# Patient Record
Sex: FEMALE | Race: WHITE | NOT HISPANIC OR LATINO | URBAN - METROPOLITAN AREA
[De-identification: names, ages, dates, MRNs, and addresses within clinical notes are randomized per-mention and may not be internally consistent; named-entity substitution may affect disease eponyms.]

---

## 2017-01-06 ENCOUNTER — OUTPATIENT (OUTPATIENT)
Dept: OUTPATIENT SERVICES | Facility: HOSPITAL | Age: 72
LOS: 1 days | End: 2017-01-06
Payer: MEDICARE

## 2017-01-06 VITALS
RESPIRATION RATE: 18 BRPM | HEART RATE: 70 BPM | DIASTOLIC BLOOD PRESSURE: 53 MMHG | OXYGEN SATURATION: 96 % | HEIGHT: 64 IN | SYSTOLIC BLOOD PRESSURE: 155 MMHG | WEIGHT: 250 LBS | TEMPERATURE: 98 F

## 2017-01-06 DIAGNOSIS — Z98.890 OTHER SPECIFIED POSTPROCEDURAL STATES: Chronic | ICD-10-CM

## 2017-01-06 DIAGNOSIS — Z01.818 ENCOUNTER FOR OTHER PREPROCEDURAL EXAMINATION: ICD-10-CM

## 2017-01-06 DIAGNOSIS — Z90.49 ACQUIRED ABSENCE OF OTHER SPECIFIED PARTS OF DIGESTIVE TRACT: Chronic | ICD-10-CM

## 2017-01-06 DIAGNOSIS — I20.0 UNSTABLE ANGINA: ICD-10-CM

## 2017-01-06 LAB
ALBUMIN SERPL ELPH-MCNC: 4.2 G/DL — SIGNIFICANT CHANGE UP (ref 3.3–5)
ALP SERPL-CCNC: 65 U/L — SIGNIFICANT CHANGE UP (ref 40–120)
ALT FLD-CCNC: 25 U/L RC — SIGNIFICANT CHANGE UP (ref 10–45)
ANION GAP SERPL CALC-SCNC: 17 MMOL/L — SIGNIFICANT CHANGE UP (ref 5–17)
AST SERPL-CCNC: 27 U/L — SIGNIFICANT CHANGE UP (ref 10–40)
BILIRUB SERPL-MCNC: 0.4 MG/DL — SIGNIFICANT CHANGE UP (ref 0.2–1.2)
BUN SERPL-MCNC: 39 MG/DL — HIGH (ref 7–23)
CALCIUM SERPL-MCNC: 9.5 MG/DL — SIGNIFICANT CHANGE UP (ref 8.4–10.5)
CHLORIDE SERPL-SCNC: 105 MMOL/L — SIGNIFICANT CHANGE UP (ref 96–108)
CO2 SERPL-SCNC: 21 MMOL/L — LOW (ref 22–31)
CREAT SERPL-MCNC: 1.44 MG/DL — HIGH (ref 0.5–1.3)
GLUCOSE SERPL-MCNC: 146 MG/DL — HIGH (ref 70–99)
HCT VFR BLD CALC: 30.5 % — LOW (ref 34.5–45)
HGB BLD-MCNC: 10.2 G/DL — LOW (ref 11.5–15.5)
MCHC RBC-ENTMCNC: 27.8 PG — SIGNIFICANT CHANGE UP (ref 27–34)
MCHC RBC-ENTMCNC: 33.5 GM/DL — SIGNIFICANT CHANGE UP (ref 32–36)
MCV RBC AUTO: 83.1 FL — SIGNIFICANT CHANGE UP (ref 80–100)
PLATELET # BLD AUTO: 267 K/UL — SIGNIFICANT CHANGE UP (ref 150–400)
POTASSIUM SERPL-MCNC: 4.5 MMOL/L — SIGNIFICANT CHANGE UP (ref 3.5–5.3)
POTASSIUM SERPL-SCNC: 4.5 MMOL/L — SIGNIFICANT CHANGE UP (ref 3.5–5.3)
PROT SERPL-MCNC: 7.2 G/DL — SIGNIFICANT CHANGE UP (ref 6–8.3)
RBC # BLD: 3.67 M/UL — LOW (ref 3.8–5.2)
RBC # FLD: 14.8 % — HIGH (ref 10.3–14.5)
SODIUM SERPL-SCNC: 143 MMOL/L — SIGNIFICANT CHANGE UP (ref 135–145)
WBC # BLD: 8 K/UL — SIGNIFICANT CHANGE UP (ref 3.8–10.5)
WBC # FLD AUTO: 8 K/UL — SIGNIFICANT CHANGE UP (ref 3.8–10.5)

## 2017-01-06 PROCEDURE — 93010 ELECTROCARDIOGRAM REPORT: CPT | Mod: 77

## 2017-01-06 PROCEDURE — 93010 ELECTROCARDIOGRAM REPORT: CPT

## 2017-01-06 RX ORDER — DEXTROSE 50 % IN WATER 50 %
1 SYRINGE (ML) INTRAVENOUS ONCE
Qty: 0 | Refills: 0 | Status: DISCONTINUED | OUTPATIENT
Start: 2017-01-06 | End: 2017-01-07

## 2017-01-06 RX ORDER — INSULIN LISPRO 100/ML
VIAL (ML) SUBCUTANEOUS
Qty: 0 | Refills: 0 | Status: DISCONTINUED | OUTPATIENT
Start: 2017-01-06 | End: 2017-01-07

## 2017-01-06 RX ORDER — SERTRALINE 25 MG/1
50 TABLET, FILM COATED ORAL DAILY
Qty: 0 | Refills: 0 | Status: DISCONTINUED | OUTPATIENT
Start: 2017-01-06 | End: 2017-01-07

## 2017-01-06 RX ORDER — DEXTROSE 50 % IN WATER 50 %
25 SYRINGE (ML) INTRAVENOUS ONCE
Qty: 0 | Refills: 0 | Status: DISCONTINUED | OUTPATIENT
Start: 2017-01-06 | End: 2017-01-07

## 2017-01-06 RX ORDER — LOSARTAN POTASSIUM 100 MG/1
25 TABLET, FILM COATED ORAL DAILY
Qty: 0 | Refills: 0 | Status: CANCELLED | OUTPATIENT
Start: 2017-01-09 | End: 2017-01-07

## 2017-01-06 RX ORDER — CLOPIDOGREL BISULFATE 75 MG/1
75 TABLET, FILM COATED ORAL DAILY
Qty: 0 | Refills: 0 | Status: DISCONTINUED | OUTPATIENT
Start: 2017-01-07 | End: 2017-01-07

## 2017-01-06 RX ORDER — ATORVASTATIN CALCIUM 80 MG/1
40 TABLET, FILM COATED ORAL AT BEDTIME
Qty: 0 | Refills: 0 | Status: DISCONTINUED | OUTPATIENT
Start: 2017-01-06 | End: 2017-01-07

## 2017-01-06 RX ORDER — ASPIRIN/CALCIUM CARB/MAGNESIUM 324 MG
81 TABLET ORAL DAILY
Qty: 0 | Refills: 0 | Status: DISCONTINUED | OUTPATIENT
Start: 2017-01-06 | End: 2017-01-07

## 2017-01-06 RX ORDER — DEXTROSE 50 % IN WATER 50 %
12.5 SYRINGE (ML) INTRAVENOUS ONCE
Qty: 0 | Refills: 0 | Status: DISCONTINUED | OUTPATIENT
Start: 2017-01-06 | End: 2017-01-07

## 2017-01-06 RX ORDER — SODIUM CHLORIDE 9 MG/ML
1000 INJECTION, SOLUTION INTRAVENOUS
Qty: 0 | Refills: 0 | Status: DISCONTINUED | OUTPATIENT
Start: 2017-01-06 | End: 2017-01-07

## 2017-01-06 RX ORDER — SODIUM CHLORIDE 9 MG/ML
3 INJECTION INTRAMUSCULAR; INTRAVENOUS; SUBCUTANEOUS EVERY 8 HOURS
Qty: 0 | Refills: 0 | Status: DISCONTINUED | OUTPATIENT
Start: 2017-01-06 | End: 2017-01-07

## 2017-01-06 RX ORDER — INSULIN LISPRO 100/ML
VIAL (ML) SUBCUTANEOUS AT BEDTIME
Qty: 0 | Refills: 0 | Status: DISCONTINUED | OUTPATIENT
Start: 2017-01-06 | End: 2017-01-07

## 2017-01-06 RX ORDER — GLUCAGON INJECTION, SOLUTION 0.5 MG/.1ML
1 INJECTION, SOLUTION SUBCUTANEOUS ONCE
Qty: 0 | Refills: 0 | Status: DISCONTINUED | OUTPATIENT
Start: 2017-01-06 | End: 2017-01-07

## 2017-01-06 RX ORDER — SODIUM CHLORIDE 9 MG/ML
1000 INJECTION INTRAMUSCULAR; INTRAVENOUS; SUBCUTANEOUS
Qty: 0 | Refills: 0 | Status: DISCONTINUED | OUTPATIENT
Start: 2017-01-06 | End: 2017-01-07

## 2017-01-06 RX ORDER — BUMETANIDE 0.25 MG/ML
1 INJECTION INTRAMUSCULAR; INTRAVENOUS EVERY OTHER DAY
Qty: 0 | Refills: 0 | Status: DISCONTINUED | OUTPATIENT
Start: 2017-01-06 | End: 2017-01-07

## 2017-01-06 RX ORDER — LOSARTAN POTASSIUM 100 MG/1
25 TABLET, FILM COATED ORAL DAILY
Qty: 0 | Refills: 0 | Status: DISCONTINUED | OUTPATIENT
Start: 2017-01-06 | End: 2017-01-06

## 2017-01-06 RX ADMIN — SODIUM CHLORIDE 3 MILLILITER(S): 9 INJECTION INTRAMUSCULAR; INTRAVENOUS; SUBCUTANEOUS at 21:57

## 2017-01-06 RX ADMIN — SODIUM CHLORIDE 75 MILLILITER(S): 9 INJECTION INTRAMUSCULAR; INTRAVENOUS; SUBCUTANEOUS at 15:30

## 2017-01-06 RX ADMIN — SODIUM CHLORIDE 3 MILLILITER(S): 9 INJECTION INTRAMUSCULAR; INTRAVENOUS; SUBCUTANEOUS at 16:29

## 2017-01-06 NOTE — H&P CARDIOLOGY - HISTORY OF PRESENT ILLNESS
71 years old female pt with PMHx of HTN, HLD, DMT2, AMAN not on CPAP, takes O2 at night who presents for cardiac cath. Pt reports that she has been experiencing chest pain with SOB for few months, evaluated by Dr. Davis, had stress test at [facility] which was normal(not official reports available) advised to have cardiac cath for persistent symptoms. Pt states she had chest tightness with SOB today while walking in to Hospital.

## 2017-01-06 NOTE — H&P CARDIOLOGY - FAMILY HISTORY
Sibling  Still living? Yes, Estimated age: Age Unknown  Family history of stroke, Age at diagnosis: Age Unknown

## 2017-01-06 NOTE — H&P CARDIOLOGY - PMH
DM (diabetes mellitus)    Former smoker    HLD (hyperlipidemia)    HTN (hypertension)    AMAN (obstructive sleep apnea) DM (diabetes mellitus)    Former smoker    Former smoker    HLD (hyperlipidemia)    HTN (hypertension)    Obesity, Class III, BMI 40-49.9 (morbid obesity)    AMAN (obstructive sleep apnea)

## 2017-01-07 VITALS
SYSTOLIC BLOOD PRESSURE: 161 MMHG | OXYGEN SATURATION: 92 % | TEMPERATURE: 98 F | RESPIRATION RATE: 18 BRPM | DIASTOLIC BLOOD PRESSURE: 77 MMHG | HEART RATE: 69 BPM

## 2017-01-07 LAB
ANION GAP SERPL CALC-SCNC: 16 MMOL/L — SIGNIFICANT CHANGE UP (ref 5–17)
BUN SERPL-MCNC: 39 MG/DL — HIGH (ref 7–23)
CALCIUM SERPL-MCNC: 9.1 MG/DL — SIGNIFICANT CHANGE UP (ref 8.4–10.5)
CHLORIDE SERPL-SCNC: 108 MMOL/L — SIGNIFICANT CHANGE UP (ref 96–108)
CO2 SERPL-SCNC: 19 MMOL/L — LOW (ref 22–31)
CREAT SERPL-MCNC: 1.54 MG/DL — HIGH (ref 0.5–1.3)
GLUCOSE SERPL-MCNC: 139 MG/DL — HIGH (ref 70–99)
HBA1C BLD-MCNC: 6.6 % — HIGH (ref 4–5.6)
HCT VFR BLD CALC: 30.2 % — LOW (ref 34.5–45)
HGB BLD-MCNC: 10.1 G/DL — LOW (ref 11.5–15.5)
MCHC RBC-ENTMCNC: 27.8 PG — SIGNIFICANT CHANGE UP (ref 27–34)
MCHC RBC-ENTMCNC: 33.5 GM/DL — SIGNIFICANT CHANGE UP (ref 32–36)
MCV RBC AUTO: 82.9 FL — SIGNIFICANT CHANGE UP (ref 80–100)
PLATELET # BLD AUTO: 253 K/UL — SIGNIFICANT CHANGE UP (ref 150–400)
POTASSIUM SERPL-MCNC: 4.6 MMOL/L — SIGNIFICANT CHANGE UP (ref 3.5–5.3)
POTASSIUM SERPL-SCNC: 4.6 MMOL/L — SIGNIFICANT CHANGE UP (ref 3.5–5.3)
RBC # BLD: 3.64 M/UL — LOW (ref 3.8–5.2)
RBC # FLD: 14.8 % — HIGH (ref 10.3–14.5)
SODIUM SERPL-SCNC: 143 MMOL/L — SIGNIFICANT CHANGE UP (ref 135–145)
WBC # BLD: 8.6 K/UL — SIGNIFICANT CHANGE UP (ref 3.8–10.5)
WBC # FLD AUTO: 8.6 K/UL — SIGNIFICANT CHANGE UP (ref 3.8–10.5)

## 2017-01-07 PROCEDURE — 93005 ELECTROCARDIOGRAM TRACING: CPT

## 2017-01-07 PROCEDURE — 93010 ELECTROCARDIOGRAM REPORT: CPT

## 2017-01-07 PROCEDURE — C1894: CPT

## 2017-01-07 PROCEDURE — 83036 HEMOGLOBIN GLYCOSYLATED A1C: CPT

## 2017-01-07 PROCEDURE — 93458 L HRT ARTERY/VENTRICLE ANGIO: CPT | Mod: 59

## 2017-01-07 PROCEDURE — C1874: CPT

## 2017-01-07 PROCEDURE — 80048 BASIC METABOLIC PNL TOTAL CA: CPT

## 2017-01-07 PROCEDURE — C1887: CPT

## 2017-01-07 PROCEDURE — C9600: CPT | Mod: LC

## 2017-01-07 PROCEDURE — 80053 COMPREHEN METABOLIC PANEL: CPT

## 2017-01-07 PROCEDURE — C1769: CPT

## 2017-01-07 PROCEDURE — 85027 COMPLETE CBC AUTOMATED: CPT

## 2017-01-07 RX ORDER — ATORVASTATIN CALCIUM 80 MG/1
1 TABLET, FILM COATED ORAL
Qty: 30 | Refills: 0 | OUTPATIENT
Start: 2017-01-07 | End: 2017-02-06

## 2017-01-07 RX ORDER — PITAVASTATIN CALCIUM 1.04 MG/1
1 TABLET, FILM COATED ORAL
Qty: 30 | Refills: 0
Start: 2017-01-07 | End: 2017-02-06

## 2017-01-07 RX ORDER — CLOPIDOGREL BISULFATE 75 MG/1
1 TABLET, FILM COATED ORAL
Qty: 90 | Refills: 3
Start: 2017-01-07 | End: 2018-01-01

## 2017-01-07 RX ADMIN — CLOPIDOGREL BISULFATE 75 MILLIGRAM(S): 75 TABLET, FILM COATED ORAL at 05:21

## 2017-01-07 RX ADMIN — Medication 30 MILLILITER(S): at 07:37

## 2017-01-07 RX ADMIN — SODIUM CHLORIDE 3 MILLILITER(S): 9 INJECTION INTRAMUSCULAR; INTRAVENOUS; SUBCUTANEOUS at 05:21

## 2017-01-07 RX ADMIN — Medication 2: at 07:39

## 2017-01-07 RX ADMIN — Medication 81 MILLIGRAM(S): at 05:21

## 2017-01-07 NOTE — DIETITIAN INITIAL EVALUATION ADULT. - ENERGY NEEDS
height: 5'4", weight: 253.9 pounds, BMI: 43.6, ideal body weight: 120 pounds (+/-10%), %IBW: 212%  Pertinent information: Per chart pt s/p cath with stents x 2. No edema or pressure ulcers noted.

## 2017-01-07 NOTE — DISCHARGE NOTE ADULT - PATIENT PORTAL LINK FT
“You can access the [facility] Patient Portal, offered by [facility], by registering with the following website: [email or web address]”

## 2017-01-07 NOTE — DIETITIAN INITIAL EVALUATION ADULT. - ADHERENCE
Pt states that she has been educated on the heart healthy and consistent carbohydrate diet in the past, however unable to state what she learned in the past. Receptive to review/reinforcement. Pt states she would have english muffin or egg or decaf tea with whole wheat bread for breakfast, sandwich or soup (dea or homemade) for lunch and chicken with vegetables, salad and bread or pasta for dinner. Snacks on sometimes ice cream, potato chips. States she would check her fingersticks twice daily and states they were "running crazy." States they were typically between 90 and 140 mg/dL. States they were never less than 70 mg/dL.

## 2017-01-07 NOTE — DISCHARGE NOTE ADULT - MEDICATION SUMMARY - MEDICATIONS TO TAKE
I will START or STAY ON the medications listed below when I get home from the hospital:    Aspirin Enteric Coated 81 mg oral delayed release tablet  -- 1 tab(s) by mouth once a day  -- Indication: For Stented coronary artery    losartan 25 mg oral tablet  -- 1 tab(s) by mouth once a day  -- Indication: For HTN (hypertension)    Zoloft 50 mg oral tablet  -- 1 tab(s) by mouth once a day  -- Indication: For Depression    metFORMIN 500 mg oral tablet  -- 1 tab(s) by mouth once a day. DO NOT TAKE on 1/7 or 1/8, restart on 1/9.  -- Indication: For Diabetes type 2    atorvastatin 40 mg oral tablet  -- 1 tab(s) by mouth once a day (at bedtime)  -- Indication: For HLD (hyperlipidemia)    clopidogrel 75 mg oral tablet  -- 1 tab(s) by mouth once a day  -- Indication: For Stented coronary artery    Bumex  -- 1 milligram(s) by mouth 3 times a week  M/W/F  -- Indication: For HTN (hypertension)    Benefiber oral powder for reconstitution  -- 2 unit(s) by mouth 2 times a day  -- Indication: For constipation    Probiotic Formula (Bacillus Coagulans) oral capsule  -- 1 cap(s) by mouth once a day  -- Indication: For Supplement I will START or STAY ON the medications listed below when I get home from the hospital:    Aspirin Enteric Coated 81 mg oral delayed release tablet  -- 1 tab(s) by mouth once a day  -- Indication: For Stented coronary artery    losartan 25 mg oral tablet  -- 1 tab(s) by mouth once a day  -- Indication: For HTN (hypertension)    Zoloft 50 mg oral tablet  -- 1 tab(s) by mouth once a day  -- Indication: For Depression    metFORMIN 500 mg oral tablet  -- 1 tab(s) by mouth once a day. DO NOT TAKE on 1/7 or 1/8, restart on 1/9.  -- Indication: For Diabetes type 2    Livalo 2 mg oral tablet  -- 1 tab(s) by mouth once a day MDD:1 tab/day  -- Do not take this drug if you are pregnant.  Obtain medical advice before taking any non-prescription drugs as some may affect the action of this medication.    -- Indication: For HLD (hyperlipidemia)    clopidogrel 75 mg oral tablet  -- 1 tab(s) by mouth once a day  -- Indication: For Stented coronary artery    Bumex  -- 1 milligram(s) by mouth 3 times a week  M/W/F  -- Indication: For HTN (hypertension)    Benefiber oral powder for reconstitution  -- 2 unit(s) by mouth 2 times a day  -- Indication: For constipation    Probiotic Formula (Bacillus Coagulans) oral capsule  -- 1 cap(s) by mouth once a day  -- Indication: For Supplement

## 2017-01-07 NOTE — DIETITIAN INITIAL EVALUATION ADULT. - NS AS NUTRI INTERV FEED ASSISTANCE
1) Reviewed alternate menu options and menu ordering procedure. 2) provide food preferences within diet restrictions when requested.

## 2017-01-07 NOTE — DIETITIAN INITIAL EVALUATION ADULT. - OTHER INFO
Consult received for BMI greater than 40. Pt states that she had lost 70 pounds in May of 2015 and states she gained it all back because she was given a medication in [place] for her blood pressure which caused her blood pressure to drop to low and she feel and tore ligaments in her foot. States she was in a wheelchair, boot and using a walker for about 6 months which caused her weight gain. States she was also having difficulty breathing so she could not exercise. PTA pt reports taking a multivitamin. Currently pt states her appetite is still slightly decreased, however states she ate her chicken and tea last night and had cereal this morning for breakfast. Per RN documentation pt consumed 75% of breakfast. Pt denies difficulty chewing/swallowing. Denies nausea/vomiting, states that she sometimes has constipation or diarrhea. States she had a bowel movement this morning however states it was hard for her to go- made NP aware. NKFA.

## 2017-01-07 NOTE — DIETITIAN INITIAL EVALUATION ADULT. - ORAL INTAKE PTA
Pt states her appetite was slightly decreased PTA. States that about 3 weeks ago she had a partial bowel obstruction which affected her appetite a bit.

## 2017-01-07 NOTE — DISCHARGE NOTE ADULT - HOSPITAL COURSE
71 years old female pt with PMHx of HTN, HLD, DMT2, AMAN not on CPAP, takes O2 at night who presents for cardiac cath. Pt reports that she has been experiencing chest pain with SOB for few months, evaluated by Dr. Davis, had stress test at [facility] which was normal(not official reports available) advised to have cardiac cath for persistent symptoms. Patient states she had chest tightness with SOB today while walking in to Hospital.     1/6 cardiac cath with 2 stents to the prox left circ. Right radial insertion site without swelling, bleeding.

## 2017-01-07 NOTE — DISCHARGE NOTE ADULT - CARE PROVIDER_API CALL
Pancho Davis (DO), Cardiology; Internal Medicine; Nuclear Cardiology  [address] [facility] [address] [place] [address]  Phone: [phone]  Fax: [phone]

## 2017-01-07 NOTE — DIETITIAN INITIAL EVALUATION ADULT. - ETIOLOGY
lack of prior exposure to nutrition related information lack of prior exposure to nutrition information, excessive consumption of calories

## 2017-01-07 NOTE — DISCHARGE NOTE ADULT - PLAN OF CARE
Patient remains chest pain free and understands post cath discharge instructions. No heavy lifting or pushing/pulling with procedure arm for 2 weeks. No driving for 2 days. You may shower 24 hours following the procedure but avoid baths/swimming for 1 week. Check your wrist site for bleeding and/or swelling daily following procedure and call your doctor immediately if it occurs or if you experience increased pain at the site. Follow up with your cardiologist in 1-2 weeks. You may call [facility] if you have any questions/concerns regarding your procedure [phone]. Do not stop you Aspirin or Plavix unless instructed to do so by your cardiologist. Low salt, low fat diet.   Weight management.   Take medications as prescribed.    No smoking.  Follow up appointments with your doctor(s)  as instructed. Your blood pressure will be controlled. Continue with your blood pressure medications; eat a heart healthy diet with low salt diet; exercise regularly (consult with your physician or cardiologist first); maintain a heart healthy weight; if you smoke - quit (A resource to help you stop smoking is the [facility] – phone number [phone].); include healthy ways to manage stress. Continue to follow with your primary care physician or cardiologist. Your LDL cholesterol will be less than 70mg/dL Continue with your cholesterol medications. Eat a heart healthy diet that is low in saturated fats and salt, and includes whole grains, fruits, vegetables and lean protein; exercise regularly (consult with your physician or cardiologist first); maintain a heart healthy weight; if you smoke - quit (A resource to help you stop smoking is the [facility] – phone number [phone].). Continue to follow with your primary physician or cardiologist. Your hemoglobin A1C will be between 7-8 Continue to follow with your primary care MD or your endocrinologist.  Follow a heart healthy diabetic diet. If you check your fingerstick glucose at home, call your MD if it is greater than 250mg/dL on 2 occasions or less than 100mg/dL on 2 occasions. Know signs of low blood sugar, such as: dizziness, shakiness, sweating, confusion, hunger, nervousness-drink 4 ounces apple juice if occurs and call your doctor. Know early signs of high blood sugar, such as: frequent urination, increased thirst, blurry vision, fatigue, headache - call your doctor if this occurs. Follow with other practitioners to care for your diabetes, such as ophthamologist and podiatrist.

## 2017-01-07 NOTE — DIETITIAN INITIAL EVALUATION ADULT. - SIGNS/SYMPTOMS
pt verbalizes inaccurate information regarding therapeutic diet. BMI: 43.6, pt verbalizes not following a diet PTA.

## 2017-01-07 NOTE — DISCHARGE NOTE ADULT - CARE PLAN
Principal Discharge DX:	Coronary artery disease of native artery of native heart with stable angina pectoris  Goal:	Patient remains chest pain free and understands post cath discharge instructions.  Instructions for follow-up, activity and diet:	No heavy lifting or pushing/pulling with procedure arm for 2 weeks. No driving for 2 days. You may shower 24 hours following the procedure but avoid baths/swimming for 1 week. Check your wrist site for bleeding and/or swelling daily following procedure and call your doctor immediately if it occurs or if you experience increased pain at the site. Follow up with your cardiologist in 1-2 weeks. You may call [facility] if you have any questions/concerns regarding your procedure [phone]. Do not stop you Aspirin or Plavix unless instructed to do so by your cardiologist. Low salt, low fat diet.   Weight management.   Take medications as prescribed.    No smoking.  Follow up appointments with your doctor(s)  as instructed.  Secondary Diagnosis:	HTN (hypertension), benign  Goal:	Your blood pressure will be controlled.  Instructions for follow-up, activity and diet:	Continue with your blood pressure medications; eat a heart healthy diet with low salt diet; exercise regularly (consult with your physician or cardiologist first); maintain a heart healthy weight; if you smoke - quit (A resource to help you stop smoking is the [facility] [employer] Control – phone number [phone].); include healthy ways to manage stress. Continue to follow with your primary care physician or cardiologist.  Secondary Diagnosis:	Other hyperlipidemia  Goal:	Your LDL cholesterol will be less than 70mg/dL  Instructions for follow-up, activity and diet:	Continue with your cholesterol medications. Eat a heart healthy diet that is low in saturated fats and salt, and includes whole grains, fruits, vegetables and lean protein; exercise regularly (consult with your physician or cardiologist first); maintain a heart healthy weight; if you smoke - quit (A resource to help you stop smoking is the [facility] [employer] Control – phone number [phone].). Continue to follow with your primary physician or cardiologist.  Secondary Diagnosis:	Type 2 diabetes mellitus without complication, without long-term current use of insulin  Goal:	Your hemoglobin A1C will be between 7-8  Instructions for follow-up, activity and diet:	Continue to follow with your primary care MD or your endocrinologist.  Follow a heart healthy diabetic diet. If you check your fingerstick glucose at home, call your MD if it is greater than 250mg/dL on 2 occasions or less than 100mg/dL on 2 occasions. Know signs of low blood sugar, such as: dizziness, shakiness, sweating, confusion, hunger, nervousness-drink 4 ounces apple juice if occurs and call your doctor. Know early signs of high blood sugar, such as: frequent urination, increased thirst, blurry vision, fatigue, headache - call your doctor if this occurs. Follow with other practitioners to care for your diabetes, such as ophthamologist and podiatrist. Principal Discharge DX:	Coronary artery disease of native artery of native heart with stable angina pectoris  Goal:	Patient remains chest pain free and understands post cath discharge instructions.  Instructions for follow-up, activity and diet:	No heavy lifting or pushing/pulling with procedure arm for 2 weeks. No driving for 2 days. You may shower 24 hours following the procedure but avoid baths/swimming for 1 week. Check your wrist site for bleeding and/or swelling daily following procedure and call your doctor immediately if it occurs or if you experience increased pain at the site. Follow up with your cardiologist in 1-2 weeks. You may call [facility] if you have any questions/concerns regarding your procedure [phone]. Do not stop you Aspirin or Plavix unless instructed to do so by your cardiologist. Low salt, low fat diet.   Weight management.   Take medications as prescribed.    No smoking.  Follow up appointments with your doctor(s)  as instructed.  Secondary Diagnosis:	HTN (hypertension), benign  Goal:	Your blood pressure will be controlled.  Instructions for follow-up, activity and diet:	Continue with your blood pressure medications; eat a heart healthy diet with low salt diet; exercise regularly (consult with your physician or cardiologist first); maintain a heart healthy weight; if you smoke - quit (A resource to help you stop smoking is the [facility] Control – phone number [phone].); include healthy ways to manage stress. Continue to follow with your primary care physician or cardiologist.  Secondary Diagnosis:	Other hyperlipidemia  Goal:	Your LDL cholesterol will be less than 70mg/dL  Instructions for follow-up, activity and diet:	Continue with your cholesterol medications. Eat a heart healthy diet that is low in saturated fats and salt, and includes whole grains, fruits, vegetables and lean protein; exercise regularly (consult with your physician or cardiologist first); maintain a heart healthy weight; if you smoke - quit (A resource to help you stop smoking is the [facility] Control – phone number [phone].). Continue to follow with your primary physician or cardiologist.  Secondary Diagnosis:	Type 2 diabetes mellitus without complication, without long-term current use of insulin  Goal:	Your hemoglobin A1C will be between 7-8  Instructions for follow-up, activity and diet:	Continue to follow with your primary care MD or your endocrinologist.  Follow a heart healthy diabetic diet. If you check your fingerstick glucose at home, call your MD if it is greater than 250mg/dL on 2 occasions or less than 100mg/dL on 2 occasions. Know signs of low blood sugar, such as: dizziness, shakiness, sweating, confusion, hunger, nervousness-drink 4 ounces apple juice if occurs and call your doctor. Know early signs of high blood sugar, such as: frequent urination, increased thirst, blurry vision, fatigue, headache - call your doctor if this occurs. Follow with other practitioners to care for your diabetes, such as ophthamologist and podiatrist. Principal Discharge DX:	Coronary artery disease of native artery of native heart with stable angina pectoris  Goal:	Patient remains chest pain free and understands post cath discharge instructions.  Instructions for follow-up, activity and diet:	No heavy lifting or pushing/pulling with procedure arm for 2 weeks. No driving for 2 days. You may shower 24 hours following the procedure but avoid baths/swimming for 1 week. Check your wrist site for bleeding and/or swelling daily following procedure and call your doctor immediately if it occurs or if you experience increased pain at the site. Follow up with your cardiologist in 1-2 weeks. You may call [facility] if you have any questions/concerns regarding your procedure [phone]. Do not stop you Aspirin or Plavix unless instructed to do so by your cardiologist. Low salt, low fat diet.   Weight management.   Take medications as prescribed.    No smoking.  Follow up appointments with your doctor(s)  as instructed.  Secondary Diagnosis:	HTN (hypertension), benign  Goal:	Your blood pressure will be controlled.  Instructions for follow-up, activity and diet:	Continue with your blood pressure medications; eat a heart healthy diet with low salt diet; exercise regularly (consult with your physician or cardiologist first); maintain a heart healthy weight; if you smoke - quit (A resource to help you stop smoking is the [facility] [employer] Control – phone number [phone].); include healthy ways to manage stress. Continue to follow with your primary care physician or cardiologist.  Secondary Diagnosis:	Other hyperlipidemia  Goal:	Your LDL cholesterol will be less than 70mg/dL  Instructions for follow-up, activity and diet:	Continue with your cholesterol medications. Eat a heart healthy diet that is low in saturated fats and salt, and includes whole grains, fruits, vegetables and lean protein; exercise regularly (consult with your physician or cardiologist first); maintain a heart healthy weight; if you smoke - quit (A resource to help you stop smoking is the [facility] [employer] Control – phone number [phone].). Continue to follow with your primary physician or cardiologist.  Secondary Diagnosis:	Type 2 diabetes mellitus without complication, without long-term current use of insulin  Goal:	Your hemoglobin A1C will be between 7-8  Instructions for follow-up, activity and diet:	Continue to follow with your primary care MD or your endocrinologist.  Follow a heart healthy diabetic diet. If you check your fingerstick glucose at home, call your MD if it is greater than 250mg/dL on 2 occasions or less than 100mg/dL on 2 occasions. Know signs of low blood sugar, such as: dizziness, shakiness, sweating, confusion, hunger, nervousness-drink 4 ounces apple juice if occurs and call your doctor. Know early signs of high blood sugar, such as: frequent urination, increased thirst, blurry vision, fatigue, headache - call your doctor if this occurs. Follow with other practitioners to care for your diabetes, such as ophthamologist and podiatrist.

## 2017-01-07 NOTE — DIETITIAN INITIAL EVALUATION ADULT. - NS AS NUTRI INTERV ED CONTENT
1) Discussed heart healthy, consistent carbohydrate diet education .Discussed foods that contain carbohydrates, consuming carbohydrates with protein, avoiding concentrated sweets, portion control/plate method, importance of monitoring blood glucose levels, limiting sodium in the diet, lean meats, whole grains ,low fat dairy products and increased consumption of fruits and vegetables. Provided written materials. Pt and family verbalized understanding. Made pt and family aware RD available for further diet education upon pt/family request.

## 2017-01-07 NOTE — DISCHARGE NOTE ADULT - SECONDARY DIAGNOSIS.
HTN (hypertension), benign Other hyperlipidemia Type 2 diabetes mellitus without complication, without long-term current use of insulin

## 2017-01-11 RX ORDER — METFORMIN HYDROCHLORIDE 850 MG/1
1 TABLET ORAL
Qty: 0 | Refills: 0 | COMMUNITY

## 2018-02-08 PROBLEM — I10 ESSENTIAL (PRIMARY) HYPERTENSION: Chronic | Status: ACTIVE | Noted: 2017-01-06

## 2018-02-08 PROBLEM — Z87.891 PERSONAL HISTORY OF NICOTINE DEPENDENCE: Chronic | Status: ACTIVE | Noted: 2017-01-06

## 2018-02-08 PROBLEM — E11.9 TYPE 2 DIABETES MELLITUS WITHOUT COMPLICATIONS: Chronic | Status: ACTIVE | Noted: 2017-01-06

## 2018-02-08 PROBLEM — E78.5 HYPERLIPIDEMIA, UNSPECIFIED: Chronic | Status: ACTIVE | Noted: 2017-01-06

## 2018-02-08 PROBLEM — E66.01 MORBID (SEVERE) OBESITY DUE TO EXCESS CALORIES: Chronic | Status: ACTIVE | Noted: 2017-01-06

## 2018-02-12 ENCOUNTER — TRANSCRIPTION ENCOUNTER (OUTPATIENT)
Age: 73
End: 2018-02-12

## 2018-02-12 ENCOUNTER — OUTPATIENT (OUTPATIENT)
Dept: OUTPATIENT SERVICES | Facility: HOSPITAL | Age: 73
LOS: 1 days | End: 2018-02-12
Payer: MEDICARE

## 2018-02-12 VITALS
DIASTOLIC BLOOD PRESSURE: 60 MMHG | HEIGHT: 64 IN | HEART RATE: 49 BPM | SYSTOLIC BLOOD PRESSURE: 136 MMHG | RESPIRATION RATE: 16 BRPM | WEIGHT: 242.07 LBS | TEMPERATURE: 98 F | OXYGEN SATURATION: 96 %

## 2018-02-12 DIAGNOSIS — Z98.890 OTHER SPECIFIED POSTPROCEDURAL STATES: Chronic | ICD-10-CM

## 2018-02-12 DIAGNOSIS — Z95.5 PRESENCE OF CORONARY ANGIOPLASTY IMPLANT AND GRAFT: Chronic | ICD-10-CM

## 2018-02-12 DIAGNOSIS — Z90.49 ACQUIRED ABSENCE OF OTHER SPECIFIED PARTS OF DIGESTIVE TRACT: Chronic | ICD-10-CM

## 2018-02-12 DIAGNOSIS — I99.8 OTHER DISORDER OF CIRCULATORY SYSTEM: Chronic | ICD-10-CM

## 2018-02-12 DIAGNOSIS — I20.0 UNSTABLE ANGINA: ICD-10-CM

## 2018-02-12 LAB
ALBUMIN SERPL ELPH-MCNC: 4.2 G/DL — SIGNIFICANT CHANGE UP (ref 3.3–5)
ALP SERPL-CCNC: 59 U/L — SIGNIFICANT CHANGE UP (ref 40–120)
ALT FLD-CCNC: 16 U/L RC — SIGNIFICANT CHANGE UP (ref 10–45)
ANION GAP SERPL CALC-SCNC: 16 MMOL/L — SIGNIFICANT CHANGE UP (ref 5–17)
AST SERPL-CCNC: 17 U/L — SIGNIFICANT CHANGE UP (ref 10–40)
BILIRUB SERPL-MCNC: 0.6 MG/DL — SIGNIFICANT CHANGE UP (ref 0.2–1.2)
BUN SERPL-MCNC: 69 MG/DL — HIGH (ref 7–23)
CALCIUM SERPL-MCNC: 9.3 MG/DL — SIGNIFICANT CHANGE UP (ref 8.4–10.5)
CHLORIDE SERPL-SCNC: 105 MMOL/L — SIGNIFICANT CHANGE UP (ref 96–108)
CO2 SERPL-SCNC: 21 MMOL/L — LOW (ref 22–31)
CREAT SERPL-MCNC: 2.25 MG/DL — HIGH (ref 0.5–1.3)
GLUCOSE BLDC GLUCOMTR-MCNC: 139 MG/DL — HIGH (ref 70–99)
GLUCOSE BLDC GLUCOMTR-MCNC: 151 MG/DL — HIGH (ref 70–99)
GLUCOSE BLDC GLUCOMTR-MCNC: 216 MG/DL — HIGH (ref 70–99)
GLUCOSE SERPL-MCNC: 178 MG/DL — HIGH (ref 70–99)
HCT VFR BLD CALC: 35.2 % — SIGNIFICANT CHANGE UP (ref 34.5–45)
HGB BLD-MCNC: 11.6 G/DL — SIGNIFICANT CHANGE UP (ref 11.5–15.5)
MCHC RBC-ENTMCNC: 27.7 PG — SIGNIFICANT CHANGE UP (ref 27–34)
MCHC RBC-ENTMCNC: 33 GM/DL — SIGNIFICANT CHANGE UP (ref 32–36)
MCV RBC AUTO: 83.9 FL — SIGNIFICANT CHANGE UP (ref 80–100)
PLATELET # BLD AUTO: 277 K/UL — SIGNIFICANT CHANGE UP (ref 150–400)
POTASSIUM SERPL-MCNC: 5 MMOL/L — SIGNIFICANT CHANGE UP (ref 3.5–5.3)
POTASSIUM SERPL-SCNC: 5 MMOL/L — SIGNIFICANT CHANGE UP (ref 3.5–5.3)
PROT SERPL-MCNC: 7.9 G/DL — SIGNIFICANT CHANGE UP (ref 6–8.3)
RBC # BLD: 4.19 M/UL — SIGNIFICANT CHANGE UP (ref 3.8–5.2)
RBC # FLD: 13.4 % — SIGNIFICANT CHANGE UP (ref 10.3–14.5)
SODIUM SERPL-SCNC: 142 MMOL/L — SIGNIFICANT CHANGE UP (ref 135–145)
WBC # BLD: 9.8 K/UL — SIGNIFICANT CHANGE UP (ref 3.8–10.5)
WBC # FLD AUTO: 9.8 K/UL — SIGNIFICANT CHANGE UP (ref 3.8–10.5)

## 2018-02-12 PROCEDURE — 93010 ELECTROCARDIOGRAM REPORT: CPT

## 2018-02-12 RX ORDER — SUCRALFATE 1 G
1 TABLET ORAL
Qty: 0 | Refills: 0 | Status: DISCONTINUED | OUTPATIENT
Start: 2018-02-12 | End: 2018-02-13

## 2018-02-12 RX ORDER — INSULIN LISPRO 100/ML
VIAL (ML) SUBCUTANEOUS AT BEDTIME
Qty: 0 | Refills: 0 | Status: DISCONTINUED | OUTPATIENT
Start: 2018-02-12 | End: 2018-02-13

## 2018-02-12 RX ORDER — WHEAT DEXTRIN 3 G/4 G
2 POWDER IN PACKET (EA) ORAL
Qty: 0 | Refills: 0 | COMMUNITY

## 2018-02-12 RX ORDER — LOSARTAN POTASSIUM 100 MG/1
1 TABLET, FILM COATED ORAL
Qty: 0 | Refills: 0 | COMMUNITY

## 2018-02-12 RX ORDER — ATORVASTATIN CALCIUM 80 MG/1
10 TABLET, FILM COATED ORAL AT BEDTIME
Qty: 0 | Refills: 0 | Status: DISCONTINUED | OUTPATIENT
Start: 2018-02-12 | End: 2018-02-13

## 2018-02-12 RX ORDER — DEXTROSE 50 % IN WATER 50 %
12.5 SYRINGE (ML) INTRAVENOUS ONCE
Qty: 0 | Refills: 0 | Status: DISCONTINUED | OUTPATIENT
Start: 2018-02-12 | End: 2018-02-13

## 2018-02-12 RX ORDER — SPIRONOLACTONE 25 MG/1
12.5 TABLET, FILM COATED ORAL DAILY
Qty: 0 | Refills: 0 | Status: DISCONTINUED | OUTPATIENT
Start: 2018-02-12 | End: 2018-02-13

## 2018-02-12 RX ORDER — DEXTROSE 50 % IN WATER 50 %
1 SYRINGE (ML) INTRAVENOUS ONCE
Qty: 0 | Refills: 0 | Status: DISCONTINUED | OUTPATIENT
Start: 2018-02-12 | End: 2018-02-13

## 2018-02-12 RX ORDER — GLUCAGON INJECTION, SOLUTION 0.5 MG/.1ML
1 INJECTION, SOLUTION SUBCUTANEOUS ONCE
Qty: 0 | Refills: 0 | Status: DISCONTINUED | OUTPATIENT
Start: 2018-02-12 | End: 2018-02-13

## 2018-02-12 RX ORDER — ASPIRIN/CALCIUM CARB/MAGNESIUM 324 MG
81 TABLET ORAL DAILY
Qty: 0 | Refills: 0 | Status: DISCONTINUED | OUTPATIENT
Start: 2018-02-12 | End: 2018-02-13

## 2018-02-12 RX ORDER — BUMETANIDE 0.25 MG/ML
1 INJECTION INTRAMUSCULAR; INTRAVENOUS
Qty: 0 | Refills: 0 | COMMUNITY

## 2018-02-12 RX ORDER — SODIUM CHLORIDE 9 MG/ML
1000 INJECTION, SOLUTION INTRAVENOUS
Qty: 0 | Refills: 0 | Status: DISCONTINUED | OUTPATIENT
Start: 2018-02-12 | End: 2018-02-13

## 2018-02-12 RX ORDER — LABETALOL HCL 100 MG
200 TABLET ORAL
Qty: 0 | Refills: 0 | Status: DISCONTINUED | OUTPATIENT
Start: 2018-02-12 | End: 2018-02-13

## 2018-02-12 RX ORDER — DEXTROSE 50 % IN WATER 50 %
25 SYRINGE (ML) INTRAVENOUS ONCE
Qty: 0 | Refills: 0 | Status: DISCONTINUED | OUTPATIENT
Start: 2018-02-12 | End: 2018-02-13

## 2018-02-12 RX ORDER — INSULIN LISPRO 100/ML
VIAL (ML) SUBCUTANEOUS
Qty: 0 | Refills: 0 | Status: DISCONTINUED | OUTPATIENT
Start: 2018-02-12 | End: 2018-02-13

## 2018-02-12 RX ORDER — SODIUM CHLORIDE 9 MG/ML
1000 INJECTION INTRAMUSCULAR; INTRAVENOUS; SUBCUTANEOUS
Qty: 0 | Refills: 0 | Status: DISCONTINUED | OUTPATIENT
Start: 2018-02-12 | End: 2018-02-13

## 2018-02-12 RX ORDER — LOSARTAN POTASSIUM 100 MG/1
50 TABLET, FILM COATED ORAL DAILY
Qty: 0 | Refills: 0 | Status: DISCONTINUED | OUTPATIENT
Start: 2018-02-12 | End: 2018-02-13

## 2018-02-12 RX ORDER — CLOPIDOGREL BISULFATE 75 MG/1
75 TABLET, FILM COATED ORAL DAILY
Qty: 0 | Refills: 0 | Status: DISCONTINUED | OUTPATIENT
Start: 2018-02-12 | End: 2018-02-13

## 2018-02-12 RX ORDER — SERTRALINE 25 MG/1
1 TABLET, FILM COATED ORAL
Qty: 0 | Refills: 0 | COMMUNITY

## 2018-02-12 RX ORDER — PANTOPRAZOLE SODIUM 20 MG/1
40 TABLET, DELAYED RELEASE ORAL
Qty: 0 | Refills: 0 | Status: DISCONTINUED | OUTPATIENT
Start: 2018-02-12 | End: 2018-02-13

## 2018-02-12 RX ORDER — ACETAMINOPHEN 500 MG
650 TABLET ORAL EVERY 6 HOURS
Qty: 0 | Refills: 0 | Status: DISCONTINUED | OUTPATIENT
Start: 2018-02-12 | End: 2018-02-13

## 2018-02-12 RX ADMIN — Medication 200 MILLIGRAM(S): at 17:21

## 2018-02-12 RX ADMIN — Medication 1 GRAM(S): at 17:20

## 2018-02-12 NOTE — DISCHARGE NOTE ADULT - PATIENT PORTAL LINK FT
You can access the [facility] Patient Portal, offered by [facility], by registering with the following website: [email or web address]

## 2018-02-12 NOTE — DISCHARGE NOTE ADULT - NS AS ACTIVITY OBS
No Heavy lifting/straining/Walking-Outdoors allowed/Walking-Indoors allowed/Showering allowed Walking-Indoors allowed/No Heavy lifting/straining/Showering allowed/no driving for 48 hours/Walking-Outdoors allowed

## 2018-02-12 NOTE — H&P CARDIOLOGY - PSH
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

## 2018-02-12 NOTE — DISCHARGE NOTE ADULT - PLAN OF CARE
Patient remains chest pain free and understands post cath discharge instructions. Do not stop you Aspirin or Plavix unless instructed to do so by your cardiologist.   No heavy lifting or pushing/pulling with procedure arm for 2 weeks. No driving for 2 days. You may shower 24 hours following the procedure but avoid baths/swimming for 1 week. Check your wrist site and brachial site for bleeding and/or swelling daily following procedure and call your doctor immediately if it occurs or if you experience increased pain at the site. Follow up with your cardiologist in 1-2 weeks. You may call [facility] if you have any questions/concerns regarding your procedure [phone]. Your blood pressure will be controlled. Continue with your blood pressure medications; eat a heart healthy diet with low salt diet; exercise regularly (consult with your physician or cardiologist first); maintain a heart healthy weight; if you smoke - quit (A resource to help you stop smoking is the [facility] – phone number [phone].); include healthy ways to manage stress. Continue to follow with your primary care physician or cardiologist. Your LDL cholesterol will be less than 70mg/dL Continue with your cholesterol medications. Eat a heart healthy diet that is low in saturated fats and salt, and includes whole grains, fruits, vegetables and lean protein; exercise regularly (consult with your physician or cardiologist first); maintain a heart healthy weight; if you smoke - quit (A resource to help you stop smoking is the [facility] for Tobacco Control – phone number [phone].). Continue to follow with your primary physician or cardiologist.

## 2018-02-12 NOTE — DISCHARGE NOTE ADULT - MEDICATION SUMMARY - MEDICATIONS TO TAKE
I will START or STAY ON the medications listed below when I get home from the hospital:    spironolactone 25 mg oral tablet  -- 0.5 tab(s) by mouth once a day  -- Indication: For Pulmonary hypertension    Aspirin Enteric Coated 81 mg oral delayed release tablet  -- 1 tab(s) by mouth once a day  -- Indication: For helping keep stented coronary arteries open    acetaminophen 325 mg oral tablet  -- 2 tab(s) by mouth every 6 hours, As needed, Mild Pain (1 - 3)  -- Indication: For Mild pain    losartan 50 mg oral tablet  -- 1 tab(s) by mouth once a day  -- Indication: For Hypertension    metFORMIN 500 mg oral tablet  -- 1 tab(s) by mouth once a day. DO NOT TAKE on 2/13 or 2/14, restart on 2/15.  -- Indication: For Diabetes type 2    Livalo 2 mg oral tablet  -- 1 tab(s) by mouth once a day MDD:1 tab/day  -- Do not take this drug if you are pregnant.  Obtain medical advice before taking any non-prescription drugs as some may affect the action of this medication.    -- Indication: For Hyperlipidemia    clopidogrel 75 mg oral tablet  -- 1 tab(s) by mouth once a day  -- Indication: For helping keep stented coronary arteries open    labetalol 200 mg oral tablet  -- 1 tab(s) by mouth 2 times a day  -- Indication: For Hypertension    famotidine 20 mg oral tablet  -- 1 tab(s) by mouth 2 times a day  -- Indication: For reflux    sucralfate 1 g oral tablet  -- 1 tab(s) by mouth 2 times a day  -- Indication: For GI tract    Probiotic Formula (Bacillus Coagulans) oral capsule  -- 1 cap(s) by mouth once a day  -- Indication: For GI tract    pantoprazole 40 mg oral delayed release tablet  -- 1 tab(s) by mouth once a day (before a meal)  -- Indication: For reflux    multivitamin  -- 1 tab(s) by mouth once a day  -- Indication: For vitamin supplement

## 2018-02-12 NOTE — DISCHARGE NOTE ADULT - CARE PROVIDER_API CALL
Pancho Davis (DO), Cardiology; Internal Medicine; Nuclear Cardiology  [place] [address]  Phone: [phone]  Fax: [phone]

## 2018-02-12 NOTE — DISCHARGE NOTE ADULT - CARE PLAN
Principal Discharge DX:	Coronary artery disease involving native coronary artery of native heart, angina presence unspecified  Goal:	Patient remains chest pain free and understands post cath discharge instructions.  Assessment and plan of treatment:	Do not stop you Aspirin or Plavix unless instructed to do so by your cardiologist.   No heavy lifting or pushing/pulling with procedure arm for 2 weeks. No driving for 2 days. You may shower 24 hours following the procedure but avoid baths/swimming for 1 week. Check your wrist site and brachial site for bleeding and/or swelling daily following procedure and call your doctor immediately if it occurs or if you experience increased pain at the site. Follow up with your cardiologist in 1-2 weeks. You may call [facility] if you have any questions/concerns regarding your procedure [phone].  Secondary Diagnosis:	Hypertension, unspecified type  Goal:	Your blood pressure will be controlled.  Assessment and plan of treatment:	Continue with your blood pressure medications; eat a heart healthy diet with low salt diet; exercise regularly (consult with your physician or cardiologist first); maintain a heart healthy weight; if you smoke - quit (A resource to help you stop smoking is the [facility] [employer] for Tobacco Control – phone number [phone].); include healthy ways to manage stress. Continue to follow with your primary care physician or cardiologist.  Secondary Diagnosis:	Hyperlipidemia, unspecified hyperlipidemia type  Goal:	Your LDL cholesterol will be less than 70mg/dL  Assessment and plan of treatment:	Continue with your cholesterol medications. Eat a heart healthy diet that is low in saturated fats and salt, and includes whole grains, fruits, vegetables and lean protein; exercise regularly (consult with your physician or cardiologist first); maintain a heart healthy weight; if you smoke - quit (A resource to help you stop smoking is the [facility] [employer] for [employer] – phone number [phone].). Continue to follow with your primary physician or cardiologist.

## 2018-02-12 NOTE — H&P CARDIOLOGY - PMH
Coronary artery disease involving native coronary artery of native heart, angina presence unspecified    Diastolic dysfunction    Diverticulitis    DM (diabetes mellitus)    Former smoker    Former smoker    Gastroesophageal reflux disease, esophagitis presence not specified    HLD (hyperlipidemia)    HTN (hypertension)    Mitral valve insufficiency, unspecified etiology    Obesity, Class III, BMI 40-49.9 (morbid obesity)    AMAN (obstructive sleep apnea)    Pulmonary hypertension    Stage 4 chronic kidney disease    SVT (supraventricular tachycardia)    Tricuspid valve insufficiency, unspecified etiology Coronary artery disease involving native coronary artery of native heart, angina presence unspecified    Diastolic dysfunction    Diverticulitis    DM (diabetes mellitus)    Former smoker    Former smoker    Gastroesophageal reflux disease, esophagitis presence not specified    HLD (hyperlipidemia)    HTN (hypertension)    Mitral valve insufficiency, unspecified etiology    Obesity, Class III, BMI 40-49.9 (morbid obesity)    AMAN (obstructive sleep apnea)  ?  Pulmonary hypertension    Stage 4 chronic kidney disease    SVT (supraventricular tachycardia)    Tricuspid valve insufficiency, unspecified etiology

## 2018-02-12 NOTE — DISCHARGE NOTE ADULT - HOSPITAL COURSE
73 y/o female with known CAD s/p PCI of pLCx (1/6/17), CKD stage 4 (Nephrologist Dr. Hutchison), HTN, HLD, DM2 (most recent A1C 6.0, managed by PCP Dr. 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 Symptoms onset on ambulation of ~20 feet. Reports +orthopnea +LE edema; intermittent palpitations. Pt was seen and evaluated by cardiologist, Dr. Davis, and reports had LE dopplers negative for DVT and CTA negative for PE (no official report in chart). Referred for R/LHC today.  POBA to OM1 (99%) via RRA. 71 y/o female with known CAD s/p PCI of pLCx (1/6/17), CKD stage 4 (Nephrologist Dr. Hutchison), HTN, HLD, DM2 (most recent A1C 6.0, managed by PCP Dr. 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 Symptoms onset on ambulation of ~20 feet. Reports +orthopnea +LE edema; intermittent palpitations. Pt was seen and evaluated by cardiologist, Dr. Davis, and reports had LE dopplers negative for DVT and CTA negative for PE (no official report in chart). Referred for R/LHC today.    2/12 POBA to OM1 (99%) via Right Radial Artery, site without swelling, bleeding. 71 y/o female with known CAD s/p PCI of pLCx (1/6/17), CKD stage 4 (Nephrologist Dr. Hutchison), HTN, HLD, DM2 (most recent A1C 6.0, managed by PCP Dr. 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 Symptoms onset on ambulation of ~20 feet. Reports +orthopnea +LE edema; intermittent palpitations. Pt was seen and evaluated by cardiologist, Dr. Davis, and reports had LE dopplers negative for DVT and CTA negative for PE (no official report in chart). Referred for R/LHC today.    2/12 POBA to OM1 (99%) via Right Radial Artery, site without swelling, bleeding. Patient tolerated procedure well, no post procedure issues. Case discussed with Dr. Herman, stable for discharge today

## 2018-02-12 NOTE — H&P CARDIOLOGY - HISTORY OF PRESENT ILLNESS
71 y/o female with known CAD s/p PCI of pLCx (1/6/17), CKD stage 4 (Nephrologist Dr. Hutchison), HTN, HLD, DM2 (most recent A1C 6.0, managed by PCP Dr. Hudson), mitral and tricuspid insufficiency, diastolic dysfunction, pulmonary HTN, SVT with loop recorder s/p ablation, GERD 73 y/o female with known CAD s/p PCI of pLCx (1/6/17), CKD stage 4 (Nephrologist Dr. Hutchison), HTN, HLD, DM2 (most recent A1C 6.0, managed by PCP Dr. 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 Symptoms onset on ambulation of ~20 feet. Reports +orthopnea +LE edema; intermittent palpitations. Pt was seen and evaluated by cardiologist, Dr. Davis, and reports had LE dopplers negative for DVT and CTA negative for PE (no official report in chart). Referred for R/LHC today.  < from: Cardiac Cath Lab - Adult (01.06.17 @ 14:32) >  VENTRICLES: Global left ventricular function was hypercontractile. EF  estimated was 70 %.  CORONARY VESSELS: The coronary circulation is right dominant.  LM:   --  LM: Normal.  LAD:   --  Distal LAD: Angiography showed mild atherosclerosis with noflow  limiting lesions.  CX:   --  Proximal circumflex: There was a 70 % stenosis.  RCA:   --  RCA: Normal.  COMPLICATIONS: There were no complications.  INTERVENTIONAL RECOMMENDATIONS: ASA and Plavix for 1 year    < end of copied text >

## 2018-02-12 NOTE — DISCHARGE NOTE ADULT - ADDITIONAL INSTRUCTIONS
Do not stop you Aspirin or Plavix unless instructed to do so by your cardiologist. Do not stop you Aspirin or Plavix unless instructed to do so by your cardiologist.  No heavy lifting or pushing/pulling with procedure arm for 2 weeks. No driving for 2 days. You may shower 24 hours following the procedure but avoid baths/swimming for 1 week. Check your wrist site for bleeding and/or swelling daily following procedure and call your doctor immediately if it occurs or if you experience increased pain at the site. Follow up with your cardiologist in 1-2 weeks. You may call [facility] if you have any questions/concerns regarding your procedure [phone].

## 2018-02-13 VITALS
SYSTOLIC BLOOD PRESSURE: 159 MMHG | HEART RATE: 53 BPM | TEMPERATURE: 98 F | OXYGEN SATURATION: 100 % | RESPIRATION RATE: 18 BRPM | DIASTOLIC BLOOD PRESSURE: 51 MMHG

## 2018-02-13 DIAGNOSIS — I10 ESSENTIAL (PRIMARY) HYPERTENSION: ICD-10-CM

## 2018-02-13 DIAGNOSIS — E78.5 HYPERLIPIDEMIA, UNSPECIFIED: ICD-10-CM

## 2018-02-13 DIAGNOSIS — N18.4 CHRONIC KIDNEY DISEASE, STAGE 4 (SEVERE): ICD-10-CM

## 2018-02-13 DIAGNOSIS — I25.10 ATHEROSCLEROTIC HEART DISEASE OF NATIVE CORONARY ARTERY WITHOUT ANGINA PECTORIS: ICD-10-CM

## 2018-02-13 LAB
ANION GAP SERPL CALC-SCNC: 13 MMOL/L — SIGNIFICANT CHANGE UP (ref 5–17)
BUN SERPL-MCNC: 63 MG/DL — HIGH (ref 7–23)
CALCIUM SERPL-MCNC: 8.9 MG/DL — SIGNIFICANT CHANGE UP (ref 8.4–10.5)
CHLORIDE SERPL-SCNC: 110 MMOL/L — HIGH (ref 96–108)
CO2 SERPL-SCNC: 22 MMOL/L — SIGNIFICANT CHANGE UP (ref 22–31)
CREAT SERPL-MCNC: 2.03 MG/DL — HIGH (ref 0.5–1.3)
GLUCOSE BLDC GLUCOMTR-MCNC: 147 MG/DL — HIGH (ref 70–99)
GLUCOSE SERPL-MCNC: 154 MG/DL — HIGH (ref 70–99)
HBA1C BLD-MCNC: 6.9 % — HIGH (ref 4–5.6)
HCT VFR BLD CALC: 31.9 % — LOW (ref 34.5–45)
HGB BLD-MCNC: 10.8 G/DL — LOW (ref 11.5–15.5)
MCHC RBC-ENTMCNC: 28.7 PG — SIGNIFICANT CHANGE UP (ref 27–34)
MCHC RBC-ENTMCNC: 33.9 GM/DL — SIGNIFICANT CHANGE UP (ref 32–36)
MCV RBC AUTO: 84.7 FL — SIGNIFICANT CHANGE UP (ref 80–100)
PLATELET # BLD AUTO: 252 K/UL — SIGNIFICANT CHANGE UP (ref 150–400)
POTASSIUM SERPL-MCNC: 4.7 MMOL/L — SIGNIFICANT CHANGE UP (ref 3.5–5.3)
POTASSIUM SERPL-SCNC: 4.7 MMOL/L — SIGNIFICANT CHANGE UP (ref 3.5–5.3)
RBC # BLD: 3.76 M/UL — LOW (ref 3.8–5.2)
RBC # FLD: 13.6 % — SIGNIFICANT CHANGE UP (ref 10.3–14.5)
SODIUM SERPL-SCNC: 145 MMOL/L — SIGNIFICANT CHANGE UP (ref 135–145)
WBC # BLD: 7.4 K/UL — SIGNIFICANT CHANGE UP (ref 3.8–10.5)
WBC # FLD AUTO: 7.4 K/UL — SIGNIFICANT CHANGE UP (ref 3.8–10.5)

## 2018-02-13 PROCEDURE — C1725: CPT

## 2018-02-13 PROCEDURE — 80048 BASIC METABOLIC PNL TOTAL CA: CPT

## 2018-02-13 PROCEDURE — 93460 R&L HRT ART/VENTRICLE ANGIO: CPT | Mod: 59

## 2018-02-13 PROCEDURE — 85027 COMPLETE CBC AUTOMATED: CPT

## 2018-02-13 PROCEDURE — 92920 PRQ TRLUML C ANGIOP 1ART&/BR: CPT | Mod: LC

## 2018-02-13 PROCEDURE — 93005 ELECTROCARDIOGRAM TRACING: CPT

## 2018-02-13 PROCEDURE — C1817: CPT

## 2018-02-13 PROCEDURE — C1887: CPT

## 2018-02-13 PROCEDURE — 80053 COMPREHEN METABOLIC PANEL: CPT

## 2018-02-13 PROCEDURE — 99153 MOD SED SAME PHYS/QHP EA: CPT

## 2018-02-13 PROCEDURE — C1769: CPT

## 2018-02-13 PROCEDURE — C1894: CPT

## 2018-02-13 PROCEDURE — 83036 HEMOGLOBIN GLYCOSYLATED A1C: CPT

## 2018-02-13 PROCEDURE — 82962 GLUCOSE BLOOD TEST: CPT

## 2018-02-13 PROCEDURE — 99152 MOD SED SAME PHYS/QHP 5/>YRS: CPT

## 2018-02-13 RX ORDER — ACETAMINOPHEN 500 MG
2 TABLET ORAL
Qty: 0 | Refills: 0 | COMMUNITY
Start: 2018-02-13

## 2018-02-13 RX ORDER — PANTOPRAZOLE SODIUM 20 MG/1
1 TABLET, DELAYED RELEASE ORAL
Qty: 30 | Refills: 0 | OUTPATIENT
Start: 2018-02-13 | End: 2018-03-14

## 2018-02-13 RX ORDER — METFORMIN HYDROCHLORIDE 850 MG/1
1 TABLET ORAL
Qty: 0 | Refills: 0 | COMMUNITY

## 2018-02-13 RX ADMIN — Medication 1 GRAM(S): at 05:07

## 2018-02-13 RX ADMIN — PANTOPRAZOLE SODIUM 40 MILLIGRAM(S): 20 TABLET, DELAYED RELEASE ORAL at 05:06

## 2018-02-13 RX ADMIN — SPIRONOLACTONE 12.5 MILLIGRAM(S): 25 TABLET, FILM COATED ORAL at 05:06

## 2018-02-13 RX ADMIN — CLOPIDOGREL BISULFATE 75 MILLIGRAM(S): 75 TABLET, FILM COATED ORAL at 05:07

## 2018-02-13 RX ADMIN — Medication 81 MILLIGRAM(S): at 05:07

## 2018-02-13 RX ADMIN — LOSARTAN POTASSIUM 50 MILLIGRAM(S): 100 TABLET, FILM COATED ORAL at 05:07

## 2018-02-13 NOTE — PROGRESS NOTE ADULT - SUBJECTIVE AND OBJECTIVE BOX
Patient is a 72y old  Female who presents with a chief complaint of dyspnea (2018 11:30)          Allergies    amoxicillin (Other)    Intolerances        Medications:  acetaminophen   Tablet. 650 milliGRAM(s) Oral every 6 hours PRN  aspirin enteric coated 81 milliGRAM(s) Oral daily  atorvastatin 10 milliGRAM(s) Oral at bedtime  clopidogrel Tablet 75 milliGRAM(s) Oral daily  dextrose 5%. 1000 milliLiter(s) IV Continuous <Continuous>  dextrose 50% Injectable 12.5 Gram(s) IV Push once  dextrose 50% Injectable 25 Gram(s) IV Push once  dextrose 50% Injectable 25 Gram(s) IV Push once  dextrose Gel 1 Dose(s) Oral once PRN  glucagon  Injectable 1 milliGRAM(s) IntraMuscular once PRN  insulin lispro (HumaLOG) corrective regimen sliding scale   SubCutaneous three times a day before meals  insulin lispro (HumaLOG) corrective regimen sliding scale   SubCutaneous at bedtime  labetalol 200 milliGRAM(s) Oral two times a day  losartan 50 milliGRAM(s) Oral daily  pantoprazole    Tablet 40 milliGRAM(s) Oral before breakfast  sodium chloride 0.9%. 1000 milliLiter(s) IV Continuous <Continuous>  spironolactone 12.5 milliGRAM(s) Oral daily  sucralfate 1 Gram(s) Oral two times a day      Vitals:  T(C): 36.6 (18 @ 19:08), Max: 36.6 (18 @ 06:49)  HR: 53 (18 @ 19:08) (49 - 60)  BP: 115/67 (18 @ 19:08) (115/67 - 161/45)  BP(mean): 85 (18 @ 06:49) (85 - 85)  RR: 17 (18 @ 19:08) (16 - 18)  SpO2: 98% (18 @ 19:08) (95% - 98%)  Wt(kg): --  Daily Height in cm: 162.56 (2018 06:49)    Daily Weight in k.8 (2018 06:49)  I&O's Summary    2018 07:01  -  2018 02:06  --------------------------------------------------------  IN: 240 mL / OUT: 0 mL / NET: 240 mL          Physical Exam:  Appearance: Normal  Eyes: PERRL, EOMI  HENT: Normal oral muscosa, NC/AT  Cardiovascular: S1S2, RRR, No M/R/G, no JVD, No Lower extremity edema  Procedural Access Site: No hematoma, Non-tender to palpation, 2+ pulse, No bruit, No Ecchymosis  Respiratory: Clear to auscultation bilaterally  Gastrointestinal: Soft, Non tender, Normal Bowel Sounds  Musculoskeletal: No clubbing, No joint deformity   Neurologic: Non-focal  Lymphatic: No lymphadenopathy  Psychiatry: AAOx3, Mood & affect appropriate  Skin: No rashes, No ecchymoses, No cyanosis        145  |  110<H>  |  63<H>  ----------------------------<  154<H>  4.7   |  22  |  2.03<H>    Ca    8.9      2018 00:43    TPro  7.9  /  Alb  4.2  /  TBili  0.6  /  DBili  x   /  AST  17  /  ALT  16  /  AlkPhos  59  02-12            Lipid panel   Hgb A1c                         10.8   7.4   )-----------( 252      ( 2018 00:43 )             31.9         ECG:    Cath: POBA to the OM1    Imaging:    Interpretation of Telemetry: Patient is a 72y old  Female who presents with a chief complaint of dyspnea (2018 11:30)          Allergies    amoxicillin (Other)    Intolerances        Medications:  acetaminophen   Tablet. 650 milliGRAM(s) Oral every 6 hours PRN  aspirin enteric coated 81 milliGRAM(s) Oral daily  atorvastatin 10 milliGRAM(s) Oral at bedtime  clopidogrel Tablet 75 milliGRAM(s) Oral daily  dextrose 5%. 1000 milliLiter(s) IV Continuous <Continuous>  dextrose 50% Injectable 12.5 Gram(s) IV Push once  dextrose 50% Injectable 25 Gram(s) IV Push once  dextrose 50% Injectable 25 Gram(s) IV Push once  dextrose Gel 1 Dose(s) Oral once PRN  glucagon  Injectable 1 milliGRAM(s) IntraMuscular once PRN  insulin lispro (HumaLOG) corrective regimen sliding scale   SubCutaneous three times a day before meals  insulin lispro (HumaLOG) corrective regimen sliding scale   SubCutaneous at bedtime  labetalol 200 milliGRAM(s) Oral two times a day  losartan 50 milliGRAM(s) Oral daily  pantoprazole    Tablet 40 milliGRAM(s) Oral before breakfast  sodium chloride 0.9%. 1000 milliLiter(s) IV Continuous <Continuous>  spironolactone 12.5 milliGRAM(s) Oral daily  sucralfate 1 Gram(s) Oral two times a day      Vitals:  T(C): 36.6 (18 @ 19:08), Max: 36.6 (18 @ 06:49)  HR: 53 (18 @ 19:08) (49 - 60)  BP: 115/67 (18 @ 19:08) (115/67 - 161/45)  BP(mean): 85 (18 @ 06:49) (85 - 85)  RR: 17 (18 @ 19:08) (16 - 18)  SpO2: 98% (18 @ 19:08) (95% - 98%)  Wt(kg): --  Daily Height in cm: 162.56 (2018 06:49)    Daily Weight in k.8 (2018 06:49)  I&O's Summary    2018 07:01  -  2018 02:06  --------------------------------------------------------  IN: 240 mL / OUT: 0 mL / NET: 240 mL          Physical Exam:  Appearance: Normal  Eyes: PERRL, EOMI  HENT: Normal oral muscosa, NC/AT  Cardiovascular: S1S2, RRR, No M/R/G, no JVD, No Lower extremity edema  Procedural Access Site: No hematoma, Non-tender to palpation, 2+ pulse, No bruit, No Ecchymosis  Respiratory: Clear to auscultation bilaterally  Gastrointestinal: Soft, Non tender, Normal Bowel Sounds  Musculoskeletal: No clubbing, No joint deformity   Neurologic: Non-focal  Lymphatic: No lymphadenopathy  Psychiatry: AAOx3, Mood & affect appropriate  Skin: No rashes, No ecchymoses, No cyanosis        145  |  110<H>  |  63<H>  ----------------------------<  154<H>  4.7   |  22  |  2.03<H>    Ca    8.9      2018 00:43    TPro  7.9  /  Alb  4.2  /  TBili  0.6  /  DBili  x   /  AST  17  /  ALT  16  /  AlkPhos  59  02-12            Lipid panel   Hgb A1c                         10.8   7.4   )-----------( 252      ( 2018 00:43 )             31.9         ECG: SB 54 bpm    Cath: POBA to the OM1    Imaging:    Interpretation of Telemetry: SB/SR 50-60 bpm

## 2018-02-13 NOTE — PROGRESS NOTE ADULT - ASSESSMENT
HPI:  71 y/o female with known CAD s/p PCI of pLCx (1/6/17), CKD stage 4 (Nephrologist Dr. Hutchison), HTN, HLD, DM2 (most recent A1C 6.0, managed by PCP Dr. 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 Symptoms onset on ambulation of ~20 feet. Reports +orthopnea +LE edema; intermittent palpitations. Pt was seen and evaluated by cardiologist, Dr. Davis, and reports had LE dopplers negative for DVT and CTA negative for PE (no official report in chart). Referred for R/LHC today.  < from: Cardiac Cath Lab - Adult (01.06.17 @ 14:32) >  VENTRICLES: Global left ventricular function was hypercontractile. EF  estimated was 70 %.  CORONARY VESSELS: The coronary circulation is right dominant.  LM:   --  LM: Normal.  LAD:   --  Distal LAD: Angiography showed mild atherosclerosis with noflow  limiting lesions.  CX:   --  Proximal circumflex: There was a 70 % stenosis.  RCA:   --  RCA: Normal.  COMPLICATIONS: There were no complications.  INTERVENTIONAL RECOMMENDATIONS: ASA and Plavix for 1 year    < end of copied text > (12 Feb 2018 06:49)

## 2018-02-20 RX ORDER — BUMETANIDE 0.25 MG/ML
1 INJECTION INTRAMUSCULAR; INTRAVENOUS
Qty: 0 | Refills: 0 | COMMUNITY

## 2018-07-18 ENCOUNTER — TRANSCRIPTION ENCOUNTER (OUTPATIENT)
Age: 73
End: 2018-07-18

## 2018-07-26 ENCOUNTER — TRANSCRIPTION ENCOUNTER (OUTPATIENT)
Age: 73
End: 2018-07-26

## 2018-07-28 ENCOUNTER — TRANSCRIPTION ENCOUNTER (OUTPATIENT)
Age: 73
End: 2018-07-28

## 2018-09-06 PROBLEM — G47.33 OBSTRUCTIVE SLEEP APNEA (ADULT) (PEDIATRIC): Chronic | Status: ACTIVE | Noted: 2017-01-06

## 2018-09-06 PROBLEM — K21.9 GASTRO-ESOPHAGEAL REFLUX DISEASE WITHOUT ESOPHAGITIS: Chronic | Status: ACTIVE | Noted: 2018-02-12

## 2018-09-06 PROBLEM — I27.20 PULMONARY HYPERTENSION, UNSPECIFIED: Chronic | Status: ACTIVE | Noted: 2018-02-12

## 2018-09-06 PROBLEM — I07.1 RHEUMATIC TRICUSPID INSUFFICIENCY: Chronic | Status: ACTIVE | Noted: 2018-02-12

## 2018-09-06 PROBLEM — N18.4 CHRONIC KIDNEY DISEASE, STAGE 4 (SEVERE): Chronic | Status: ACTIVE | Noted: 2018-02-12

## 2018-09-06 PROBLEM — I51.9 HEART DISEASE, UNSPECIFIED: Chronic | Status: ACTIVE | Noted: 2018-02-12

## 2018-09-06 PROBLEM — K57.92 DIVERTICULITIS OF INTESTINE, PART UNSPECIFIED, WITHOUT PERFORATION OR ABSCESS WITHOUT BLEEDING: Chronic | Status: ACTIVE | Noted: 2018-02-12

## 2018-09-06 PROBLEM — I25.10 ATHEROSCLEROTIC HEART DISEASE OF NATIVE CORONARY ARTERY WITHOUT ANGINA PECTORIS: Chronic | Status: ACTIVE | Noted: 2018-02-12

## 2018-09-06 PROBLEM — I47.1 SUPRAVENTRICULAR TACHYCARDIA: Chronic | Status: ACTIVE | Noted: 2018-02-12

## 2018-09-06 PROBLEM — I34.0 NONRHEUMATIC MITRAL (VALVE) INSUFFICIENCY: Chronic | Status: ACTIVE | Noted: 2018-02-12

## 2018-09-10 ENCOUNTER — OUTPATIENT (OUTPATIENT)
Dept: OUTPATIENT SERVICES | Facility: HOSPITAL | Age: 73
LOS: 1 days | End: 2018-09-10
Payer: MEDICARE

## 2018-09-10 VITALS
OXYGEN SATURATION: 98 % | RESPIRATION RATE: 18 BRPM | HEIGHT: 64 IN | HEART RATE: 61 BPM | WEIGHT: 255.96 LBS | SYSTOLIC BLOOD PRESSURE: 187 MMHG | DIASTOLIC BLOOD PRESSURE: 75 MMHG | TEMPERATURE: 98 F

## 2018-09-10 DIAGNOSIS — Z98.890 OTHER SPECIFIED POSTPROCEDURAL STATES: Chronic | ICD-10-CM

## 2018-09-10 DIAGNOSIS — K66.0 PERITONEAL ADHESIONS (POSTPROCEDURAL) (POSTINFECTION): Chronic | ICD-10-CM

## 2018-09-10 DIAGNOSIS — Z95.5 PRESENCE OF CORONARY ANGIOPLASTY IMPLANT AND GRAFT: Chronic | ICD-10-CM

## 2018-09-10 DIAGNOSIS — I20.0 UNSTABLE ANGINA: ICD-10-CM

## 2018-09-10 DIAGNOSIS — I99.8 OTHER DISORDER OF CIRCULATORY SYSTEM: Chronic | ICD-10-CM

## 2018-09-10 DIAGNOSIS — Z90.49 ACQUIRED ABSENCE OF OTHER SPECIFIED PARTS OF DIGESTIVE TRACT: Chronic | ICD-10-CM

## 2018-09-10 LAB
ALBUMIN SERPL ELPH-MCNC: 4.4 G/DL — SIGNIFICANT CHANGE UP (ref 3.3–5)
ALP SERPL-CCNC: 79 U/L — SIGNIFICANT CHANGE UP (ref 40–120)
ALT FLD-CCNC: 8 U/L — LOW (ref 10–45)
ANION GAP SERPL CALC-SCNC: 18 MMOL/L — HIGH (ref 5–17)
AST SERPL-CCNC: 12 U/L — SIGNIFICANT CHANGE UP (ref 10–40)
BILIRUB SERPL-MCNC: 0.4 MG/DL — SIGNIFICANT CHANGE UP (ref 0.2–1.2)
BUN SERPL-MCNC: 51 MG/DL — HIGH (ref 7–23)
CALCIUM SERPL-MCNC: 9.4 MG/DL — SIGNIFICANT CHANGE UP (ref 8.4–10.5)
CHLORIDE SERPL-SCNC: 102 MMOL/L — SIGNIFICANT CHANGE UP (ref 96–108)
CO2 SERPL-SCNC: 21 MMOL/L — LOW (ref 22–31)
CREAT SERPL-MCNC: 2.51 MG/DL — HIGH (ref 0.5–1.3)
GLUCOSE SERPL-MCNC: 156 MG/DL — HIGH (ref 70–99)
HCT VFR BLD CALC: 31.3 % — LOW (ref 34.5–45)
HGB BLD-MCNC: 10.3 G/DL — LOW (ref 11.5–15.5)
MCHC RBC-ENTMCNC: 27.8 PG — SIGNIFICANT CHANGE UP (ref 27–34)
MCHC RBC-ENTMCNC: 32.8 GM/DL — SIGNIFICANT CHANGE UP (ref 32–36)
MCV RBC AUTO: 84.7 FL — SIGNIFICANT CHANGE UP (ref 80–100)
PLATELET # BLD AUTO: 265 K/UL — SIGNIFICANT CHANGE UP (ref 150–400)
POTASSIUM SERPL-MCNC: 4.5 MMOL/L — SIGNIFICANT CHANGE UP (ref 3.5–5.3)
POTASSIUM SERPL-SCNC: 4.5 MMOL/L — SIGNIFICANT CHANGE UP (ref 3.5–5.3)
PROT SERPL-MCNC: 7.1 G/DL — SIGNIFICANT CHANGE UP (ref 6–8.3)
RBC # BLD: 3.7 M/UL — LOW (ref 3.8–5.2)
RBC # FLD: 14 % — SIGNIFICANT CHANGE UP (ref 10.3–14.5)
SODIUM SERPL-SCNC: 141 MMOL/L — SIGNIFICANT CHANGE UP (ref 135–145)
WBC # BLD: 7.6 K/UL — SIGNIFICANT CHANGE UP (ref 3.8–10.5)
WBC # FLD AUTO: 7.6 K/UL — SIGNIFICANT CHANGE UP (ref 3.8–10.5)

## 2018-09-10 PROCEDURE — C1894: CPT

## 2018-09-10 PROCEDURE — 93010 ELECTROCARDIOGRAM REPORT: CPT

## 2018-09-10 PROCEDURE — C1817: CPT

## 2018-09-10 PROCEDURE — 99152 MOD SED SAME PHYS/QHP 5/>YRS: CPT | Mod: GC

## 2018-09-10 PROCEDURE — 99152 MOD SED SAME PHYS/QHP 5/>YRS: CPT

## 2018-09-10 PROCEDURE — C1887: CPT

## 2018-09-10 PROCEDURE — 93005 ELECTROCARDIOGRAM TRACING: CPT

## 2018-09-10 PROCEDURE — 99153 MOD SED SAME PHYS/QHP EA: CPT

## 2018-09-10 PROCEDURE — 76937 US GUIDE VASCULAR ACCESS: CPT | Mod: 26,GC

## 2018-09-10 PROCEDURE — C1769: CPT

## 2018-09-10 PROCEDURE — 93460 R&L HRT ART/VENTRICLE ANGIO: CPT | Mod: 26,GC

## 2018-09-10 PROCEDURE — 80053 COMPREHEN METABOLIC PANEL: CPT

## 2018-09-10 PROCEDURE — 93460 R&L HRT ART/VENTRICLE ANGIO: CPT

## 2018-09-10 PROCEDURE — 85027 COMPLETE CBC AUTOMATED: CPT

## 2018-09-10 PROCEDURE — 76937 US GUIDE VASCULAR ACCESS: CPT

## 2018-09-10 RX ORDER — SODIUM CHLORIDE 9 MG/ML
1000 INJECTION INTRAMUSCULAR; INTRAVENOUS; SUBCUTANEOUS
Qty: 0 | Refills: 0 | Status: DISCONTINUED | OUTPATIENT
Start: 2018-09-10 | End: 2018-09-10

## 2018-09-10 RX ORDER — LABETALOL HCL 100 MG
1 TABLET ORAL
Qty: 0 | Refills: 0 | COMMUNITY

## 2018-09-10 RX ORDER — SPIRONOLACTONE 25 MG/1
0.5 TABLET, FILM COATED ORAL
Qty: 0 | Refills: 0 | COMMUNITY

## 2018-09-10 RX ORDER — SODIUM CHLORIDE 9 MG/ML
3 INJECTION INTRAMUSCULAR; INTRAVENOUS; SUBCUTANEOUS EVERY 8 HOURS
Qty: 0 | Refills: 0 | Status: DISCONTINUED | OUTPATIENT
Start: 2018-09-10 | End: 2018-09-25

## 2018-09-10 RX ORDER — LOSARTAN POTASSIUM 100 MG/1
1 TABLET, FILM COATED ORAL
Qty: 0 | Refills: 0 | COMMUNITY

## 2018-09-10 RX ORDER — METFORMIN HYDROCHLORIDE 850 MG/1
1 TABLET ORAL
Qty: 0 | Refills: 0 | COMMUNITY

## 2018-09-10 RX ORDER — SODIUM CHLORIDE 9 MG/ML
1000 INJECTION INTRAMUSCULAR; INTRAVENOUS; SUBCUTANEOUS
Qty: 0 | Refills: 0 | Status: DISCONTINUED | OUTPATIENT
Start: 2018-09-10 | End: 2018-09-25

## 2018-09-10 RX ORDER — SUCRALFATE 1 G
1 TABLET ORAL
Qty: 0 | Refills: 0 | COMMUNITY

## 2018-09-10 NOTE — H&P CARDIOLOGY - PSH
Abdominal adhesions    H/O hernia repair    H/O ileostomy  reversed  H/O sinus surgery    History of appendectomy    History of cholecystectomy    History of loop recorder    Pseudoaneurysm following procedure  following ablation 8/2017 right groin requiring surgical intervention  S/P colon resection    Stented coronary artery

## 2018-09-10 NOTE — H&P CARDIOLOGY - HISTORY OF PRESENT ILLNESS
72 year old female with PMH of anemia,  asthma, pulmonary htn - followed by Dr Adrian (no home O2), former smoker, obesity class 111, CAD- prior stents pCX and OM1 on 1/2017, 2/12/18, MR, TR, HTN, HLD, SVT s/p ablation,  LOOP recorder in place, GERD, colon resection for diverticulitis with ileostomy (reversed), CKD stage 4 (baseline Cr 2.1) followed by Dr Hutchison,, DMT2- A1c 5.9 three months ago followed by Dr Hudson PCP, complicated by nephropathy, presents today for cardiac catheterization. Patient reports progressive dyspnea on minimal exertion over the past 7 months. OROPEZA intermittently associated with SSCP radiating to right hand. For the past 2 months gets extreme dyspnea on minimal exertion. Was evaluated by pulmonary who ruled out PE. Evaluated by Cardiologist Dr Davis who prescribed ranexa and bumex for SOB/bipedal edema and weight gain with slight relief of symptoms. Also has hoarseness x past 2 months - will be evaluated after cardiac workup.     Referred today for angiogram. 72 year old female with PMH of anemia,  asthma, pulmonary htn - followed by Dr Adrian (no home O2), former smoker, obesity class 111, CAD- prior stents pCX and OM1 on 1/2017, 2/12/18, MR, TR, HTN, HLD, SVT s/p ablation,  LOOP recorder in place, GERD, colon resection for diverticulitis with ileostomy (reversed), CKD stage 4 (baseline Cr 2.1) followed by Dr Hutchison,, DMT2- A1c 5.9 three months ago followed by Dr Hudson PCP, complicated by nephropathy, presents today for cardiac catheterization. Patient reports progressive dyspnea on minimal exertion over the past 7 months. OROPEZA  associated with SSCP radiating to right hand. For the past 2 months gets extreme dyspnea on minimal exertion. States she was evaluated by pulmonary who ruled out PE. Evaluated by Cardiologist Dr Davis who prescribed isosorbide, ranexa and bumex for SOB/bipedal edema and weight gain with slight relief of symptoms. Also has hoarseness x past 2 months - will be evaluated after cardiac workup.     Referred today for angiogram.

## 2018-09-10 NOTE — H&P CARDIOLOGY - PMH
Coronary artery disease involving native coronary artery of native heart, angina presence unspecified    Diastolic dysfunction    Diverticulitis    DM (diabetes mellitus)    Former smoker    Former smoker    Gastroesophageal reflux disease, esophagitis presence not specified    HLD (hyperlipidemia)    HTN (hypertension)    Mitral valve insufficiency, unspecified etiology    Obesity, Class III, BMI 40-49.9 (morbid obesity)    AMAN (obstructive sleep apnea)  ?  Pulmonary hypertension    Stage 4 chronic kidney disease    SVT (supraventricular tachycardia)    Tricuspid valve insufficiency, unspecified etiology

## 2018-10-17 ENCOUNTER — TRANSCRIPTION ENCOUNTER (OUTPATIENT)
Age: 73
End: 2018-10-17

## 2018-10-18 ENCOUNTER — RESULT REVIEW (OUTPATIENT)
Age: 73
End: 2018-10-18

## 2018-10-18 ENCOUNTER — OUTPATIENT (OUTPATIENT)
Dept: OUTPATIENT SERVICES | Facility: HOSPITAL | Age: 73
LOS: 1 days | End: 2018-10-18
Payer: MEDICARE

## 2018-10-18 DIAGNOSIS — Z98.890 OTHER SPECIFIED POSTPROCEDURAL STATES: Chronic | ICD-10-CM

## 2018-10-18 DIAGNOSIS — Z95.5 PRESENCE OF CORONARY ANGIOPLASTY IMPLANT AND GRAFT: Chronic | ICD-10-CM

## 2018-10-18 DIAGNOSIS — I99.8 OTHER DISORDER OF CIRCULATORY SYSTEM: Chronic | ICD-10-CM

## 2018-10-18 DIAGNOSIS — Z90.49 ACQUIRED ABSENCE OF OTHER SPECIFIED PARTS OF DIGESTIVE TRACT: Chronic | ICD-10-CM

## 2018-10-18 DIAGNOSIS — K30 FUNCTIONAL DYSPEPSIA: ICD-10-CM

## 2018-10-18 DIAGNOSIS — K57.30 DIVERTICULOSIS OF LARGE INTESTINE WITHOUT PERFORATION OR ABSCESS WITHOUT BLEEDING: ICD-10-CM

## 2018-10-18 DIAGNOSIS — K66.0 PERITONEAL ADHESIONS (POSTPROCEDURAL) (POSTINFECTION): Chronic | ICD-10-CM

## 2018-10-18 PROCEDURE — 88312 SPECIAL STAINS GROUP 1: CPT

## 2018-10-18 PROCEDURE — 88305 TISSUE EXAM BY PATHOLOGIST: CPT | Mod: 26

## 2018-10-18 PROCEDURE — 45380 COLONOSCOPY AND BIOPSY: CPT

## 2018-10-18 PROCEDURE — 88313 SPECIAL STAINS GROUP 2: CPT

## 2018-10-18 PROCEDURE — 88305 TISSUE EXAM BY PATHOLOGIST: CPT

## 2018-10-18 PROCEDURE — 88313 SPECIAL STAINS GROUP 2: CPT | Mod: 26

## 2018-10-18 PROCEDURE — 88312 SPECIAL STAINS GROUP 1: CPT | Mod: 26

## 2018-10-18 PROCEDURE — 43239 EGD BIOPSY SINGLE/MULTIPLE: CPT

## 2019-03-12 PROBLEM — Z00.00 ENCOUNTER FOR PREVENTIVE HEALTH EXAMINATION: Status: ACTIVE | Noted: 2019-03-12

## 2019-03-14 ENCOUNTER — APPOINTMENT (OUTPATIENT)
Dept: CV DIAGNOSITCS | Facility: HOSPITAL | Age: 74
End: 2019-03-14

## 2019-03-14 ENCOUNTER — TRANSCRIPTION ENCOUNTER (OUTPATIENT)
Age: 74
End: 2019-03-14

## 2019-03-14 ENCOUNTER — OUTPATIENT (OUTPATIENT)
Dept: OUTPATIENT SERVICES | Facility: HOSPITAL | Age: 74
LOS: 1 days | End: 2019-03-14
Payer: MEDICARE

## 2019-03-14 DIAGNOSIS — Z98.890 OTHER SPECIFIED POSTPROCEDURAL STATES: Chronic | ICD-10-CM

## 2019-03-14 DIAGNOSIS — K66.0 PERITONEAL ADHESIONS (POSTPROCEDURAL) (POSTINFECTION): Chronic | ICD-10-CM

## 2019-03-14 DIAGNOSIS — Z90.49 ACQUIRED ABSENCE OF OTHER SPECIFIED PARTS OF DIGESTIVE TRACT: Chronic | ICD-10-CM

## 2019-03-14 DIAGNOSIS — Z95.5 PRESENCE OF CORONARY ANGIOPLASTY IMPLANT AND GRAFT: Chronic | ICD-10-CM

## 2019-03-14 DIAGNOSIS — I99.8 OTHER DISORDER OF CIRCULATORY SYSTEM: Chronic | ICD-10-CM

## 2019-03-14 DIAGNOSIS — I25.10 ATHEROSCLEROTIC HEART DISEASE OF NATIVE CORONARY ARTERY WITHOUT ANGINA PECTORIS: ICD-10-CM

## 2019-03-14 PROCEDURE — C8929: CPT

## 2019-03-14 PROCEDURE — 93306 TTE W/DOPPLER COMPLETE: CPT | Mod: 26

## 2020-11-07 NOTE — DISCHARGE NOTE ADULT - NS AS DC STROKE DX YN
[FreeTextEntry1] : HCM:\par -F/u for CPE\par -Routine labs due \par -Immunizations up to date \par \par ~f/u PAP/HPV, GC/Chlamydia, BV panel, Pelvic and TVUS results\par ~f/u in 2-3 weeks \par \par Above discussed with Dr. Hernández 
no

## 2021-09-01 NOTE — H&P CARDIOLOGY - RESPIRATORY
Acupuncture/TCM Follow-up/Progress Note    In Outpatient Clinic: Is Promis-10 due?  []  YES    [x]  NO   DATE DUE:09/22/21  VISIT COUNT:   Visit Count: 5       Precautions   Precautions: None noted.  · Besides acupuncture have you had any invasive needling techniques performed on you in the past 24 hours  []  YES    [x]  NO  · If yes, and if you have any of the following symptoms you should seek urgent care before receiving an acupuncture treatment today.   · Shortness of breath  · Unexplained cough  · Difficulty breathing or sharp pain with inhalation  · Unexplained pain in your chest especially on one side  · Increased heart rate  · Confusion and/or dizziness  · Blue discoloration in skin and lips  SUBJECTIVE:   Current Symptoms: Chronic low back pain with bilateral sciatica.    Savannah states that she fell again while doing some cleaning this week and injured her right foot.  She has some bruising on the dorsal and pedal sides of the foot, however, she reports that she can move it around ok and that it is just sore.  Her low back and left leg are more sore after the fall as well.  In general, she says her back pain is not so bad when she is doing activity but tends to be worse later when she is resting.  She rates her back pain at 7/10 today.  She states she is following up with her back and spine doctor next week.  She did not care for the ear seeds placed at last visit as she reports they made her feel weird so she removed them.       · History:  Savannah has had low back pain for \"as long as I can remember\"  Savannah mentions that in June of 2019 she had a fall onto her back while working and she feels her back \"hasn't been the same since\".  While the pain is chronic, she feels this event played a part in bringing it to the level of constant pain she has now.       OBJECTIVE:   Physical Inspection: Observations/Measurements  · A&Ox3, pleasant affect. Dry skin and hair. Trace edema to hands and bilateral LE.  Low  back tenderness into left glute and down left leg.    · Tongue: not visualized due to universal masking protocols.   · Pulses: deep, soft    · Traditional Chinese Medicine (TCM) Diagnosis:     1. KD gina brock, SP nimco brock    TODAY'S ACUPUNCTURE/TCM TREATMENT:              TREATMENT PLAN  Acupuncture points/merdians:  · LI4,,UB18,23,24,25,Yaoyan,UB40,GB34,ST36,SP9,SP6, KD3  · Right only: GB30    Acupuncture: # of needles used: 25    # of needles out: 25  Other Modalities Utilized: TDP Lamp and Tui Na      Patient Education:Lifestyle Recommendations   Discussed stretching and epsom salt baths after activity.    Discussed acupuncture and plan of care  Patient Verbalizes understanding of education.      ASSESSMENT OF TREATMENTS   · Patient's Post treatment comments and numerical pain score:   · 6/10  · Comments: My leg feels better.   · The patient has been compliant with TCM recommendations to0 resolve chief complaint  · To date the patient has made good progress toward their goals.    · Pt would continue to benefit from skilled acupuncture/TCM therapy to: increase range of motion, decrease pain, decrease swelling/edema, improve activity tolerance     GOALS:   · Documented at time of initial eval and updated as needed    PLAN OF CARE:   Frequency/Duration  · Acupuncture once weekly x4 and re evaluate.  Continuation plan of care and goals were established with the patient who concurs.    Acupuncture/TCM Daily Billing:    Acupuncture CPT Codes reflect billing claims  Total Time per CPT Codes: 40 minutes    For Medicare Patients: a supervising/billing physician must be identified  Is this a Medicare patient:   []  YES    [x]  NO  If yes use smartphrase .supervisingphysician to document   Breath Sounds equal & clear to percussion & auscultation, no accessory muscle use

## 2021-10-29 NOTE — DISCHARGE NOTE ADULT - CONDITION (STATED IN TERMS THAT PERMIT A SPECIFIC MEASURABLE COMPARISON WITH CONDITION ON ADMISSION):
kps 10% at time of discharge patient denies chest pain, palpitations, SOB. She ambulated today- tolerated well. Right radial site and right brachial site is WDL, no evidence of hematoma, bleeding or oozing. She denies pain at the site as well

## 2021-11-15 ENCOUNTER — OUTPATIENT (OUTPATIENT)
Dept: OUTPATIENT SERVICES | Facility: HOSPITAL | Age: 76
LOS: 1 days | End: 2021-11-15
Payer: MEDICARE

## 2021-11-15 DIAGNOSIS — Z98.890 OTHER SPECIFIED POSTPROCEDURAL STATES: Chronic | ICD-10-CM

## 2021-11-15 DIAGNOSIS — K66.0 PERITONEAL ADHESIONS (POSTPROCEDURAL) (POSTINFECTION): Chronic | ICD-10-CM

## 2021-11-15 DIAGNOSIS — Z90.49 ACQUIRED ABSENCE OF OTHER SPECIFIED PARTS OF DIGESTIVE TRACT: Chronic | ICD-10-CM

## 2021-11-15 DIAGNOSIS — R13.10 DYSPHAGIA, UNSPECIFIED: ICD-10-CM

## 2021-11-15 DIAGNOSIS — Z95.5 PRESENCE OF CORONARY ANGIOPLASTY IMPLANT AND GRAFT: Chronic | ICD-10-CM

## 2021-11-15 DIAGNOSIS — I99.8 OTHER DISORDER OF CIRCULATORY SYSTEM: Chronic | ICD-10-CM

## 2021-11-15 PROCEDURE — 74220 X-RAY XM ESOPHAGUS 1CNTRST: CPT

## 2021-11-15 PROCEDURE — 74220 X-RAY XM ESOPHAGUS 1CNTRST: CPT | Mod: 26
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
## 2022-08-10 ENCOUNTER — NON-APPOINTMENT (OUTPATIENT)
Age: 77
End: 2022-08-10

## 2023-02-28 ENCOUNTER — TRANSCRIPTION ENCOUNTER (OUTPATIENT)
Age: 78
End: 2023-02-28

## 2023-02-28 ENCOUNTER — OUTPATIENT (OUTPATIENT)
Dept: OUTPATIENT SERVICES | Facility: HOSPITAL | Age: 78
LOS: 1 days | End: 2023-02-28
Payer: MEDICARE

## 2023-02-28 VITALS
SYSTOLIC BLOOD PRESSURE: 133 MMHG | TEMPERATURE: 98 F | OXYGEN SATURATION: 98 % | DIASTOLIC BLOOD PRESSURE: 58 MMHG | RESPIRATION RATE: 15 BRPM | HEART RATE: 60 BPM

## 2023-02-28 VITALS
OXYGEN SATURATION: 100 % | SYSTOLIC BLOOD PRESSURE: 171 MMHG | RESPIRATION RATE: 17 BRPM | HEART RATE: 84 BPM | WEIGHT: 246.04 LBS | TEMPERATURE: 98 F | HEIGHT: 64 IN | DIASTOLIC BLOOD PRESSURE: 75 MMHG

## 2023-02-28 DIAGNOSIS — Z98.890 OTHER SPECIFIED POSTPROCEDURAL STATES: Chronic | ICD-10-CM

## 2023-02-28 DIAGNOSIS — Z95.0 PRESENCE OF CARDIAC PACEMAKER: Chronic | ICD-10-CM

## 2023-02-28 DIAGNOSIS — Z45.010 ENCOUNTER FOR CHECKING AND TESTING OF CARDIAC PACEMAKER PULSE GENERATOR [BATTERY]: ICD-10-CM

## 2023-02-28 DIAGNOSIS — Z90.49 ACQUIRED ABSENCE OF OTHER SPECIFIED PARTS OF DIGESTIVE TRACT: Chronic | ICD-10-CM

## 2023-02-28 DIAGNOSIS — K66.0 PERITONEAL ADHESIONS (POSTPROCEDURAL) (POSTINFECTION): Chronic | ICD-10-CM

## 2023-02-28 DIAGNOSIS — Z95.5 PRESENCE OF CORONARY ANGIOPLASTY IMPLANT AND GRAFT: Chronic | ICD-10-CM

## 2023-02-28 DIAGNOSIS — I99.8 OTHER DISORDER OF CIRCULATORY SYSTEM: Chronic | ICD-10-CM

## 2023-02-28 LAB — GLUCOSE BLDC GLUCOMTR-MCNC: 134 MG/DL — HIGH (ref 70–99)

## 2023-02-28 PROCEDURE — 82962 GLUCOSE BLOOD TEST: CPT

## 2023-02-28 PROCEDURE — C1785: CPT

## 2023-02-28 PROCEDURE — 93010 ELECTROCARDIOGRAM REPORT: CPT | Mod: 77

## 2023-02-28 PROCEDURE — 93010 ELECTROCARDIOGRAM REPORT: CPT

## 2023-02-28 PROCEDURE — 33228 REMV&REPLC PM GEN DUAL LEAD: CPT

## 2023-02-28 PROCEDURE — 93005 ELECTROCARDIOGRAM TRACING: CPT

## 2023-02-28 RX ORDER — BUDESONIDE, MICRONIZED 100 %
1 POWDER (GRAM) MISCELLANEOUS
Qty: 0 | Refills: 0 | DISCHARGE

## 2023-02-28 RX ORDER — PANTOPRAZOLE SODIUM 20 MG/1
1 TABLET, DELAYED RELEASE ORAL
Qty: 0 | Refills: 0 | DISCHARGE

## 2023-02-28 RX ORDER — SODIUM BICARBONATE 1 MEQ/ML
1 SYRINGE (ML) INTRAVENOUS
Qty: 0 | Refills: 0 | DISCHARGE

## 2023-02-28 RX ORDER — BUMETANIDE 0.25 MG/ML
2 INJECTION INTRAMUSCULAR; INTRAVENOUS
Qty: 0 | Refills: 0 | DISCHARGE

## 2023-02-28 RX ORDER — ASCORBIC ACID 60 MG
1 TABLET,CHEWABLE ORAL
Qty: 0 | Refills: 0 | DISCHARGE

## 2023-02-28 RX ORDER — OXYBUTYNIN CHLORIDE 5 MG
10 TABLET ORAL ONCE
Refills: 0 | Status: DISCONTINUED | OUTPATIENT
Start: 2023-02-28 | End: 2023-02-28

## 2023-02-28 RX ORDER — SERTRALINE 25 MG/1
1 TABLET, FILM COATED ORAL
Qty: 0 | Refills: 0 | DISCHARGE

## 2023-02-28 RX ORDER — METOPROLOL TARTRATE 50 MG
1 TABLET ORAL
Qty: 0 | Refills: 0 | DISCHARGE

## 2023-02-28 RX ORDER — ASPIRIN/CALCIUM CARB/MAGNESIUM 324 MG
1 TABLET ORAL
Qty: 0 | Refills: 0 | DISCHARGE

## 2023-02-28 RX ORDER — IRON POLYSACCHARIDE COMPLEX 150 MG
0 CAPSULE ORAL
Qty: 0 | Refills: 0 | DISCHARGE

## 2023-02-28 RX ORDER — SEMAGLUTIDE 0.68 MG/ML
0 INJECTION, SOLUTION SUBCUTANEOUS
Qty: 0 | Refills: 0 | DISCHARGE

## 2023-02-28 RX ORDER — ALBUTEROL 90 UG/1
3 AEROSOL, METERED ORAL
Qty: 0 | Refills: 0 | DISCHARGE

## 2023-02-28 RX ORDER — DAPAGLIFLOZIN 10 MG/1
1 TABLET, FILM COATED ORAL
Qty: 0 | Refills: 0 | DISCHARGE

## 2023-02-28 RX ORDER — BUMETANIDE 0.25 MG/ML
1 INJECTION INTRAMUSCULAR; INTRAVENOUS
Qty: 0 | Refills: 0 | DISCHARGE

## 2023-02-28 RX ORDER — RANOLAZINE 500 MG/1
1 TABLET, FILM COATED, EXTENDED RELEASE ORAL
Qty: 0 | Refills: 0 | DISCHARGE

## 2023-02-28 RX ORDER — LABETALOL HCL 100 MG
1 TABLET ORAL
Qty: 0 | Refills: 0 | DISCHARGE

## 2023-02-28 RX ORDER — ERYTHROPOIETIN 10000 [IU]/ML
0 INJECTION, SOLUTION INTRAVENOUS; SUBCUTANEOUS
Qty: 0 | Refills: 0 | DISCHARGE

## 2023-02-28 RX ORDER — VERAPAMIL HCL 240 MG
1 CAPSULE, EXTENDED RELEASE PELLETS 24 HR ORAL
Qty: 0 | Refills: 0 | DISCHARGE

## 2023-02-28 RX ORDER — BACILLUS COAGULANS/INULIN 1B-250 MG
1 CAPSULE ORAL
Qty: 0 | Refills: 0 | DISCHARGE

## 2023-02-28 RX ORDER — FAMOTIDINE 10 MG/ML
1 INJECTION INTRAVENOUS
Qty: 0 | Refills: 0 | DISCHARGE

## 2023-02-28 RX ORDER — ZOLPIDEM TARTRATE 10 MG/1
1 TABLET ORAL
Qty: 0 | Refills: 0 | DISCHARGE

## 2023-02-28 RX ORDER — BENRALIZUMAB 30 MG/ML
1 INJECTION, SOLUTION SUBCUTANEOUS
Qty: 0 | Refills: 0 | DISCHARGE

## 2023-02-28 RX ORDER — OXYCODONE HYDROCHLORIDE 5 MG/1
10 TABLET ORAL ONCE
Refills: 0 | Status: DISCONTINUED | OUTPATIENT
Start: 2023-02-28 | End: 2023-02-28

## 2023-02-28 RX ORDER — CHOLECALCIFEROL (VITAMIN D3) 125 MCG
1 CAPSULE ORAL
Qty: 0 | Refills: 0 | DISCHARGE

## 2023-02-28 RX ORDER — ROSUVASTATIN CALCIUM 5 MG/1
1 TABLET ORAL
Qty: 0 | Refills: 0 | DISCHARGE

## 2023-02-28 RX ORDER — ISOSORBIDE DINITRATE 5 MG/1
1 TABLET ORAL
Qty: 0 | Refills: 0 | DISCHARGE

## 2023-02-28 RX ORDER — PREGABALIN 225 MG/1
1 CAPSULE ORAL
Qty: 0 | Refills: 0 | DISCHARGE

## 2023-02-28 RX ORDER — ACETAMINOPHEN 500 MG
650 TABLET ORAL EVERY 6 HOURS
Refills: 0 | Status: DISCONTINUED | OUTPATIENT
Start: 2023-02-28 | End: 2023-03-14

## 2023-02-28 RX ADMIN — Medication 650 MILLIGRAM(S): at 16:11

## 2023-02-28 RX ADMIN — OXYCODONE HYDROCHLORIDE 10 MILLIGRAM(S): 5 TABLET ORAL at 16:30

## 2023-02-28 RX ADMIN — Medication 650 MILLIGRAM(S): at 15:18

## 2023-02-28 NOTE — ASU DISCHARGE PLAN (ADULT/PEDIATRIC) - CARE PROVIDER_API CALL
Dick Edward (MD)  Cardiac Electrophysiology; Cardiovascular Disease; Internal Medicine  [address] [place] [address], [place] [address]  Phone: [phone]  Fax: [phone]  Established Patient  Follow Up Time: Routine

## 2023-02-28 NOTE — PATIENT PROFILE ADULT - NSPROMEDSBROUGHTTOHOSP_GEN_A_NUR
Discharge Summary    CHIEF COMPLAINT ON ADMISSION  Chief Complaint   Patient presents with   • ALOC     Pt BIB [facility] for ALOC.  Per EMS pt was found down by son.  Pt GCS 8 on EMS arrival.  Pt AxOx2 in room.  Pt confused and repeating herself.         Reason for Admission  EMS     Admission Date  10/4/2018    CODE STATUS  Prior    HPI & HOSPITAL COURSE  75 y.o. 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  With IV fluids her renal function improved, her IV access was tenuous due to anasarca.  EJ attempts were unsuccessful, PICC was unsuccessful , by day 3 her mentation was improving and she no longer had leukocytosis so risk-benefit of invasive central line versus tunneled PICC were not felt to be reasonable, palliative care met with the patient and talked with her as well as with the son over the phone, they may be contemplating hospice.  CODE STATUS changed to DNR DNI .  The patient returned to her baseline mentation her chemistries improved, she was back at her baseline level of debility.  Given her chronic failure to thrive and chronic pain issues she elected to enroll with hospice at discharge      Therefore, she is discharged in fair and stable condition to hospice.    The patient met 2-midnight criteria for an inpatient stay at the time of discharge.    Discharge Date  10/9/2018    FOLLOW UP ITEMS POST DISCHARGE  Hospice    DISCHARGE DIAGNOSES  Principal Problem:    Acute encephalopathy POA: Unknown  Active Problems:    Opioid dependence with delirium (HCC) POA: Yes    Essential hypertension, benign POA: Yes    Normocytic anemia POA: Unknown    Acute renal failure superimposed on stage 3 chronic kidney disease (HCC) POA: Unknown    Exhausted vascular access POA: Unknown    Rash POA: Unknown    Hypophosphatemia POA:  Unknown    Anemia, chronic disease POA: Unknown    Hypernatremia POA: Unknown    Supratherapeutic INR POA: Unknown  Resolved Problems:    * No resolved hospital problems. *      FOLLOW UP  Future Appointments  Date Time Provider Department Center   10/11/2018 4:00 PM Javon Reeves M.D. [facility] NATALIE Reeves M.D.  [address]  [place] [address]  [phone]    Schedule an appointment as soon as possible for a visit in 3 days        MEDICATIONS ON DISCHARGE     Medication List      CHANGE how you take these medications      Instructions   mupirocin 2 % Oint  What changed:  Another medication with the same name was removed. Continue taking this medication, and follow the directions you see here.  Commonly known as:  BACTROBAN   Apply 1 Inch to affected area(s) every day.  Dose:  1 Inch        CONTINUE taking these medications      Instructions   albuterol 108 (90 Base) MCG/ACT Aers inhalation aerosol  Commonly known as:  PROAIR HFA   Inhale 2 Puffs by mouth every 6 hours as needed for Shortness of Breath.  Dose:  2 Puff     ammonium lactate 12 % Lotn  Commonly known as:  LAC-HYDRIN   APPLY TO THE AFFECTED AREA TWICE DAILY     bisoprolol 5 MG Tabs  Commonly known as:  ZEBETA   Take 0.5 Tabs by mouth every day.  Dose:  2.5 mg     buPROPion 300 MG XL tablet  Commonly known as:  WELLBUTRIN XL   Doctor's comments:  Note dose change  Take 1 Tab by mouth every morning.  Dose:  300 mg     docusate sodium 100 MG Caps  Commonly known as:  COLACE   Take 200 mg by mouth 2 times a day.  Dose:  200 mg     esomeprazole 40 MG delayed-release capsule  Commonly known as:  NEXIUM   Take 1 Cap by mouth every morning with breakfast.  Dose:  40 mg     levothyroxine 125 MCG Tabs  Commonly known as:  SYNTHROID   Take 1 Tab by mouth Every morning on an empty stomach.  Dose:  125 mcg     lisinopril 5 MG Tabs  Commonly known as:  PRINIVIL   Take 1 Tab by mouth 2 times a day.  Dose:  5 mg     sennosides 8.6 MG  "Tabs  Commonly known as:  SENOKOT   Take 8.6 mg by mouth every day. Indications: Constipation, **OTC**  Dose:  8.6 mg     Simethicone 125 MG Caps   Spray 125 mg in mouth/throat as needed (gas).  Dose:  125 mg     SYMBICORT INH   Inhale  by mouth.     tizanidine 4 MG Tabs  Commonly known as:  ZANAFLEX   Doctor's comments:  Please note quantity of 60  Take 1 Tab by mouth every 8 hours as needed (nausea and vomiting).  Dose:  4 mg     tolterodine ER 4 MG Cp24  Commonly known as:  DETROL LA   Take 1 Cap by mouth every day.  Dose:  4 mg     traZODone 100 MG Tabs  Commonly known as:  DESYREL   Take 2 Tabs by mouth every bedtime.  Dose:  200 mg     warfarin 3 MG Tabs  Commonly known as:  COUMADIN   Take 2 Tabs by mouth every day.  Dose:  6 mg        STOP taking these medications    chlorpheniramine 4 MG Tabs  Commonly known as:  CHLOR-TRIMETRON     furosemide 40 MG Tabs  Commonly known as:  LASIX     MS CONTIN 15 MG Tbcr tablet  Generic drug:  morphine ER     ondansetron 4 MG Tabs tablet  Commonly known as:  ZOFRAN     oxyCODONE-acetaminophen  MG Tabs  Commonly known as:  PERCOCET            Allergies  Allergies   Allergen Reactions   • Beta Adrenergic Blockers      depression   • Atorvastatin      \"I fell and hit my head but it could've been the other medications that I was taken.\"   • Ezetimibe      \"I fell and hit my head but it could've been the other medications that I was taken.\"   • Metoprolol      \"I fell and hit my head but it could've been the other medications that I was taken.\"     • Other Environmental    • Spironolactone      Abrupt renal failure   • Statins [Hmg-Coa-R Inhibitors]      \"I fell and hit my head but it could've been the other medications that I was taken.\"   • Tape Hives and Rash     Plastic tape       DIET  As tolerated    ACTIVITY  As tolerated    CONSULTATIONS  palliative    PROCEDURES  none    LABORATORY  Lab Results   Component Value Date    SODIUM 145 10/09/2018    POTASSIUM 3.8 " 10/09/2018    CHLORIDE 117 (H) 10/09/2018    CO2 19 (L) 10/09/2018    GLUCOSE 90 10/09/2018    BUN 12 10/09/2018    CREATININE 0.88 10/09/2018    CREATININE 1.4 06/05/2008        Lab Results   Component Value Date    WBC 8.5 10/09/2018    WBC 6.4 10/17/2012    HEMOGLOBIN 9.7 (L) 10/09/2018    HEMATOCRIT 30.4 (L) 10/09/2018    PLATELETCT 260 10/09/2018        Total time of the discharge process exceeds 32 minutes.   no

## 2023-02-28 NOTE — H&P CARDIOLOGY - NSICDXPASTMEDICALHX_GEN_ALL_CORE_FT
PAST MEDICAL HISTORY:  Coronary artery disease involving native coronary artery of native heart, angina presence unspecified     Diastolic dysfunction     Diverticulitis     DM (diabetes mellitus)     Former smoker     Former smoker     Gastroesophageal reflux disease, esophagitis presence not specified     HLD (hyperlipidemia)     HTN (hypertension)     Mitral valve insufficiency, unspecified etiology     Obesity, Class III, BMI 40-49.9 (morbid obesity)     AMAN (obstructive sleep apnea) ?    Pulmonary hypertension     Stage 4 chronic kidney disease     SVT (supraventricular tachycardia)     Tricuspid valve insufficiency, unspecified etiology

## 2023-02-28 NOTE — ASU DISCHARGE PLAN (ADULT/PEDIATRIC) - ASU DC SPECIAL INSTRUCTIONSFT
Your incision is closed with either surgical tape, staples or a surgical glue  - DO NOT scrub, rub, or pick at the site    AFTER 3 days, you may shower   - Use mild sop and gentle water stream, then pat dry with a towel   - DO NOT apply lotions, powders, ointments, or perfumes to the incision    DO NOT soak your incisional site in water fo 4-6 weeks (no baths, swimming, hot tub...)  Wear loose clothing around site for 1-2 weeks  IF surgical tape was used DO NOT remove the strips, they will fall off on their own   IF surgical glue was used, it will naturally fall off within 3 weeks  if staples were used, they will be removed in 7-10 days by your doctor    ACTIVITY:  for 2 weeks AFTER  your procedure  - DO NOT RAISE your arm above shoulder level (on the same side of your incision)  for 4 weeks AFTER your procedure   - DO NOT LIFT anything 10 lbs or heavier (on the side of your implant)   - certain activities may be limited longer, those that involve swinging your arm, and will be discussed with your EP doctor  DO NOT DRIVE until your EP Doctor or nurse practitioner/ physician assistant states it is safe to do so  A follow up appointment in 7-14 days will be arranged before your discharge    ID CARD:   you will receive an ID CARD and device company booklet   - please carry that card with you at all times    DIET   Follow the heart healthy diet recommended by you doctor   If you smoke, we recommend you quit. It will immediately improve your health     - If you would like to let us help you live smoke-free, call the [facility] at [phone]    ***CALL YOUR DOCTOR ***  IF you have fever, chills, body aches, or severe pain, swelling, redness, heat, yellow drainage from your incision site  IF bleeding  or significant new swelling from your puncture site  IF your experience lightheadedness, dizziness, or fainting spell  IF unable to get in contact with your doctor, you may call the Cardiology Office at [facility] at [phone]

## 2023-02-28 NOTE — PRE-ANESTHESIA EVALUATION ADULT - NSANTHOSAYNRD_GEN_A_CORE
No. AMAN screening performed.  STOP BANG Legend: 0-2 = LOW Risk; 3-4 = INTERMEDIATE Risk; 5-8 = HIGH Risk

## 2023-02-28 NOTE — H&P CARDIOLOGY - NSICDXFAMILYHX_GEN_ALL_CORE_FT
FAMILY HISTORY:  Sibling  Still living? Yes, Estimated age: Age Unknown  Family history of stroke, Age at diagnosis: Age Unknown

## 2023-02-28 NOTE — H&P CARDIOLOGY - HISTORY OF PRESENT ILLNESS
77 year old female with PMH of Anemia,  Asthma, Pulmonary htn - followed by Dr Adrian (no home O2), Former smoker, Obesity class 111, CAD- prior stents pCX and OM1 on 1/2017, 2/12/18, MR, TR, HTN, HLD, SVT s/p ablation, PPM,  GERD, colon resection for diverticulitis with ileostomy (reversed), CKD stage 4 (baseline Cr 2.1) followed by Dr Hutchison,, DMT2- A1c followed by Dr Hudson PCP, presents for PPM generator change.  77 year old female with PMH of Anemia,  Asthma, CAD/Stent 7/2020, Former smoker, Obesity, CAD- prior stents pCX and OM1 on 1/2017, 2/12/18, 7/26/20, MR, TR, HTN, HLD, SVT s/p ablation, PPM 2019, GERD, Colon resection for diverticulitis with ileostomy (reversed), CKD stage 4 (baseline Cr 2.1) followed by Dr Robins, DMT2- A1c 6.4, pt follows Dr Robins PCP, presents for PPM generator change.  77 year old female with PMH of Anemia,  Asthma, Former smoker, Obesity, CAD- prior stents pCX and OM1 on 1/2017, 2/12/18, 7/26/20, MR, TR, HTN, HLD, SVT s/p ablation, PPM 2019, GERD, Colon resection for diverticulitis with ileostomy (reversed), CKD stage 4 (baseline Cr 2.1) followed by Dr Robins, DMT2- A1c 6.4, pt follows Dr Robins PCP, presents for PPM generator change.

## 2023-02-28 NOTE — PATIENT PROFILE ADULT - FALL HARM RISK - HARM RISK INTERVENTIONS

## 2023-02-28 NOTE — PATIENT PROFILE ADULT - LIVING ENVIRONMENT
[General Appearance - Well Developed] : well developed [General Appearance - Well Nourished] : well nourished [Normal Appearance] : normal appearance [Well Groomed] : well groomed [General Appearance - In No Acute Distress] : no acute distress [Abdomen Soft] : soft no [Abdomen Mass (___ Cm)] : no abdominal mass palpated [Penis Abnormality] : normal uncircumcised penis [Scrotum] : the scrotum was normal [Epididymis] : the epididymides were normal [Testes Tenderness] : no tenderness of the testes [Testes Mass (___cm)] : there were no testicular masses [Skin Color & Pigmentation] : normal skin color and pigmentation [Skin Lesions] : no skin lesions [Edema] : no peripheral edema [] : no respiratory distress [Exaggerated Use Of Accessory Muscles For Inspiration] : no accessory muscle use [Oriented To Time, Place, And Person] : oriented to person, place, and time [Mood] : the mood was normal [Affect] : the affect was normal [Not Anxious] : not anxious [Normal Station and Gait] : the gait and station were normal for the patient's age [No Focal Deficits] : no focal deficits [FreeTextEntry1] : benign frenular penile attachment, 11/7/22 MARIELA + L apex

## 2023-02-28 NOTE — H&P CARDIOLOGY - NSICDXPASTSURGICALHX_GEN_ALL_CORE_FT
PAST SURGICAL HISTORY:  Abdominal adhesions     H/O hernia repair     H/O ileostomy reversed    H/O sinus surgery     History of appendectomy     History of cholecystectomy     History of loop recorder     Pseudoaneurysm following procedure following ablation 8/2017 right groin requiring surgical intervention    S/P colon resection     Stented coronary artery      PAST SURGICAL HISTORY:  Abdominal adhesions     H/O hernia repair     H/O ileostomy reversed    H/O sinus surgery     History of appendectomy     History of cholecystectomy     History of loop recorder     History of pacemaker     Pseudoaneurysm following procedure following ablation 8/2017 right groin requiring surgical intervention    S/P colon resection     Stented coronary artery

## 2023-03-14 NOTE — PRE-ANESTHESIA EVALUATION ADULT - [facility]OR[facility]_ENT_A_CORE
"Chief Complaint   Patient presents with   • Follow-up     Cardiac management   • Atrial Fibrillation     Denies symptoms of AFIB at this time   • Shortness of Breath     Has SOA with exertion, patient reports is about the same   • Med Refill     Needs refills on Omeprazole 90 day supply to [email or web address]       Subjective       Carlos Preston is a 89 y.o. male with HTN, hyperlipidemia, PAF, and IHD diagnosed in 2007 when he underwent bypass surgery.  Repeat cath in 2009 showed patent grafts. On 10/3/17 he developed chest pain and elevated blood pressure, presented to the ER, labs were stable, CXR unremarkable, but he was noted to be in atrial fibrillation with controlled ventricular response which was a new finding.  He did not have any palpitations. Xarelto was initiated.  At office followed up he was back in sinus. On 10/11/17, he underwent stress test and echocardiogram which showed small inferior wall ischemia with EF around 54%. Echocardiogram showed LV function normal at 55%.  Imdur was added with plan for cardiac cath if symptoms persisted.  ACE was changed to Edarbi for better blood pressure control. Cath in 2018 was then advised as symptoms persisted which showed EF of 55% and patent grafts. Most recent echo 2022 showed EF remained stable at 55%.     In September 2022 EKG showed atrial fibrillation with prolonged QT interval.  Magnesium level was assessed being normal at 2.2.    Today returns to the office for follow-up visit.  He denies recent chest pain or awareness of atrial fibrillation.  No syncopal episodes reported.  According to patient he was treated for pneumonia a few weeks ago and during that time had some hemoptysis which has since resolved.  He also reports having an issue with his bladder and a \"spot removed\".  He denies recent hematuria.    Cardiac History:    Past Surgical History:   Procedure Laterality Date   • CARDIAC CATHETERIZATION  05/16/2007    Dr. Mace: 60% LM and 70% LCX.   • " CARDIAC CATHETERIZATION  08/19/2009    %, OM 85-90%,Patent grafts.   • CARDIAC CATHETERIZATION  07/23/2018    Patent Grafts   • CARDIOVASCULAR STRESS TEST  08/05/2009    [facility]: Dr. Mace- EF 29%, Diffuse ischemia.   • CARDIOVASCULAR STRESS TEST  11/07/2012    [facility]- Inferoseptal infarct with jon infarct ischemia. EF 41%.   • CARDIOVASCULAR STRESS TEST  03/23/2015    L. Myoview- EF51%,fixed defect , no ischemia burden.   • CARDIOVASCULAR STRESS TEST  10/11/2017    [facility].Myoview- EF 54%. Small inferior Ischemia.   • CORONARY ARTERY BYPASS GRAFT  05/17/2007    CABG:  SVG to LAD and LCX.   • ECHO - CONVERTED  08/06/2009    Dr. Mace- EF 50%.   • ECHO - CONVERTED  02/14/2011    EF 50%, Septal WMA.   • ECHO - CONVERTED  11/07/2012    EF 40-45%, RVSP 33 mmHg.   • ECHO - CONVERTED  03/23/2015    EF 45-50%, RVSP 40mmHg,mild MR, mild PHT   • ECHO - CONVERTED  05/01/2017    EF 50-55%, mild MR   • ECHO - CONVERTED  10/11/2017    EF 55%   • ECHO - CONVERTED  03/21/2022    TLS. EF 55%. LA- 5.2 cm. Mild MR. RVSP- 44 mmHg   • US CAROTID UNILATERAL  04/16/2007    Mild Plaque in Rt. bulb and RECA.       Current Outpatient Medications   Medication Sig Dispense Refill   • busPIRone (BUSPAR) 5 MG tablet Take 1 tablet by mouth 2 (Two) Times a Day.     • Cholecalciferol (VITAMIN D3) 2000 UNITS tablet Take  by mouth daily.     • Edarbi 80 MG tablet tablet TAKE 1/2 TO 1  TABLET BY MOUTH ONCE DAILY 180 tablet 0   • isosorbide mononitrate (IMDUR) 30 MG 24 hr tablet Take 1 tablet by mouth Every Morning. 90 tablet 2   • loratadine (CLARITIN) 10 MG tablet Take 1 tablet by mouth Daily.     • metoprolol succinate XL (TOPROL-XL) 25 MG 24 hr tablet Take 1 tablet by mouth Daily. 90 tablet 3   • Multiple Vitamins-Minerals (VISION VITAMINS) tablet Take  by mouth 2 (two) times a day.     • NIFEdipine XL (PROCARDIA XL) 60 MG 24 hr tablet Take 1 tablet by mouth Daily. 90 tablet 3   • nitroglycerin (NITROSTAT) 0.4 MG SL tablet 1 under the  tongue as needed for angina, may repeat q5mins for up three doses 30 tablet 1   • omeprazole (priLOSEC) 20 MG capsule Take 1 capsule by mouth Daily. 90 capsule 3   • rivaroxaban (Xarelto) 20 MG tablet Take 1 tablet by mouth Daily. 90 tablet 3   • vitamin C (ASCORBIC ACID) 500 MG tablet Take 2 tablets by mouth Daily.       No current facility-administered medications for this visit.       Patient has no known allergies.    Past Medical History:   Diagnosis Date   • Cancer (HCC)    • H/O prostatectomy    • Hearing loss    • Heart disease    • History of back surgery    • Hypercholesterolemia    • Hyperlipidemia    • Hypertension    • IHD (ischemic heart disease)     S/P CABG   • Osteoarthritis of spine with myelopathy, lumbar region    • Pulmonary hypertension (HCC)    • S/P knee replacement    • Sinus disorder    • thyroid ultrasound 2009    Normal       Social History     Socioeconomic History   • Marital status:    Tobacco Use   • Smoking status: Former     Types: Cigarettes     Quit date: 1996     Years since quittin.4   • Smokeless tobacco: Never   Vaping Use   • Vaping Use: Never used   Substance and Sexual Activity   • Alcohol use: No   • Drug use: No       History reviewed. No pertinent family history.    Review of Systems   Constitutional: Positive for malaise/fatigue (r/t age). Negative for decreased appetite, diaphoresis and fever.   HENT: Negative for nosebleeds.    Eyes: Negative for blurred vision.   Cardiovascular: Positive for irregular heartbeat. Negative for chest pain, claudication, cyanosis, dyspnea on exertion, leg swelling, near-syncope, orthopnea, palpitations, paroxysmal nocturnal dyspnea and syncope.   Respiratory: Negative for shortness of breath.    Endocrine: Negative for cold intolerance and heat intolerance.   Hematologic/Lymphatic: Negative for bleeding problem. Bruises/bleeds easily.   Skin: Negative for rash.   Musculoskeletal: Negative for myalgias.  "  Gastrointestinal: Negative for heartburn (no symptoms as long as takes medication), melena and nausea.   Genitourinary: Negative for dysuria and hematuria.   Neurological: Positive for loss of balance (ambulates with use of cane) and weakness. Negative for dizziness and light-headedness.   Psychiatric/Behavioral: Negative for memory loss. The patient is not nervous/anxious.         BP Readings from Last 5 Encounters:   03/14/23 140/60   09/08/22 (!) 164/102   03/01/22 136/70   07/22/21 142/80   01/18/21 100/60       Wt Readings from Last 5 Encounters:   03/14/23 97.8 kg (215 lb 9.6 oz)   09/08/22 103 kg (226 lb 9.6 oz)   03/21/22 107 kg (235 lb 14.3 oz)   03/01/22 107 kg (235 lb 9.6 oz)   07/22/21 106 kg (233 lb)       Objective     /60 (BP Location: Left arm, Patient Position: Sitting)   Pulse 60   Ht 182.9 cm (72.01\")   Wt 97.8 kg (215 lb 9.6 oz)   BMI 29.23 kg/m²     Vitals and nursing note reviewed.   Constitutional:       Appearance: Healthy appearance. Not in distress.   Eyes:      Conjunctiva/sclera: Conjunctivae normal.      Pupils: Pupils are equal, round, and reactive to light.   HENT:      Head: Normocephalic.   Pulmonary:      Effort: Pulmonary effort is normal.      Breath sounds: Normal breath sounds.   Cardiovascular:      PMI at left midclavicular line. Normal rate. Irregular rhythm.   Edema:     Peripheral edema absent.   Abdominal:      General: Bowel sounds are normal.      Palpations: Abdomen is soft.   Musculoskeletal: Normal range of motion.      Cervical back: Normal range of motion and neck supple. Skin:     General: Skin is warm and dry.   Neurological:      Mental Status: Alert, oriented to person, place, and time and oriented to person, place and time.            ECG 12 Lead    Date/Time: 3/14/2023 8:17 AM  Performed by: Ella Escalante APRN  Authorized by: Ella Escalante APRN   Comparison: compared with previous ECG from 9/8/2022  Comparison to previous ECG: Atrial " fibrillation with prolonged QT being 499 ms.  Current QTc 462 ms  Rhythm: atrial fibrillation  Rate: normal  BPM: 60  Conduction: incomplete right bundle branch block                 Assessment & Plan   Diagnoses and all orders for this visit:    1. IHD (ischemic heart disease) (Primary)    2. Hx of CABG    3. Primary hypertension    4. Pure hypercholesterolemia    5. Paroxysmal atrial fibrillation (HCC)  -     ECG 12 Lead    6. Gastroesophageal reflux disease, unspecified whether esophagitis present  -     omeprazole (priLOSEC) 20 MG capsule; Take 1 capsule by mouth Daily.  Dispense: 90 capsule; Refill: 3       IHD  - S/p CABG.  Cath 2018 showed patent grafts  - Anginal symptoms denied.  Continue Imdur  - No repeat cardiac testing warranted at this time    Hypertension  - BP controlled  -Continue current antihypertensive medication management  -DASH diet    Hyperlipidemia  - Patient will follow with your office for lab orders/management  -Remains diet controlled    PAF  - EKG atrial fibrillation with normal QTc  - Heart rate 60.  Continue current dose beta-blocker and CCB  -For stroke prevention continue Xarelto therapy  - Patient bruises easily but denies other signs of bleeding    BMI 29  -Appetite good  -Weight is down a few pounds since last visit.  Continue heart healthy diet and encourage BMI around 25.    Patient appears stable from a cardiac standpoint.  Current plan of care continued.  6-month follow-up visit scheduled.                  Yes

## 2023-04-18 ENCOUNTER — OFFICE (OUTPATIENT)
Dept: URBAN - METROPOLITAN AREA CLINIC 63 | Facility: CLINIC | Age: 78
Setting detail: OPHTHALMOLOGY
End: 2023-04-18
Payer: MEDICARE

## 2023-04-18 DIAGNOSIS — H35.3132: ICD-10-CM

## 2023-04-18 DIAGNOSIS — H01.002: ICD-10-CM

## 2023-04-18 DIAGNOSIS — H01.005: ICD-10-CM

## 2023-04-18 DIAGNOSIS — Z96.1: ICD-10-CM

## 2023-04-18 DIAGNOSIS — E11.9: ICD-10-CM

## 2023-04-18 DIAGNOSIS — H35.033: ICD-10-CM

## 2023-04-18 PROCEDURE — 92134 CPTRZ OPH DX IMG PST SGM RTA: CPT | Performed by: STUDENT IN AN ORGANIZED HEALTH CARE EDUCATION/TRAINING PROGRAM

## 2023-04-18 PROCEDURE — 92004 COMPRE OPH EXAM NEW PT 1/>: CPT | Performed by: STUDENT IN AN ORGANIZED HEALTH CARE EDUCATION/TRAINING PROGRAM

## 2023-04-18 ASSESSMENT — LID EXAM ASSESSMENTS
OS_BLEPHARITIS: LLL 1+
OD_BLEPHARITIS: RLL 1+

## 2023-04-18 ASSESSMENT — TONOMETRY
OS_IOP_MMHG: 17
OD_IOP_MMHG: 15

## 2023-04-18 ASSESSMENT — CONFRONTATIONAL VISUAL FIELD TEST (CVF)
OD_FINDINGS: FULL
OS_FINDINGS: FULL

## 2023-04-18 ASSESSMENT — CORNEAL SURGICAL SCARRING: OS_SCARRING: ANTERIOR STROMAL

## 2023-04-19 PROBLEM — H35.3132 AGE RELATED MACULAR DEGENERATION DRY; BOTH EYES INTERMEDIATE: Status: ACTIVE | Noted: 2023-04-18

## 2023-04-19 ASSESSMENT — KERATOMETRY
OD_K1POWER_DIOPTERS: 43.25
OS_K1POWER_DIOPTERS: 42.00
OS_AXISANGLE_DEGREES: 056
OS_K2POWER_DIOPTERS: 46.75
OD_AXISANGLE_DEGREES: 090
OD_K2POWER_DIOPTERS: 44.00

## 2023-04-19 ASSESSMENT — REFRACTION_CURRENTRX
OS_CYLINDER: 0.00
OD_CYLINDER: 0.00
OS_OVR_VA: 20/
OD_OVR_VA: 20/
OD_AXIS: 180
OD_SPHERE: +2.00
OS_AXIS: 180
OS_SPHERE: +2.00

## 2023-04-19 ASSESSMENT — REFRACTION_AUTOREFRACTION
OS_AXIS: 148
OD_CYLINDER: -0.25
OS_SPHERE: +1.25
OS_CYLINDER: -3.50
OD_SPHERE: -0.25
OD_AXIS: 141

## 2023-04-19 ASSESSMENT — VISUAL ACUITY
OS_BCVA: 20/20
OD_BCVA: 20/30

## 2023-04-19 ASSESSMENT — SPHEQUIV_DERIVED
OD_SPHEQUIV: -0.375
OS_SPHEQUIV: -0.5

## 2023-04-19 ASSESSMENT — AXIALLENGTH_DERIVED
OD_AL: 23.6925
OS_AL: 23.466

## 2023-05-15 ENCOUNTER — OFFICE (OUTPATIENT)
Dept: URBAN - METROPOLITAN AREA CLINIC 63 | Facility: CLINIC | Age: 78
Setting detail: OPHTHALMOLOGY
End: 2023-05-15
Payer: MEDICARE

## 2023-05-15 DIAGNOSIS — E11.3213: ICD-10-CM

## 2023-05-15 DIAGNOSIS — H35.033: ICD-10-CM

## 2023-05-15 DIAGNOSIS — E11.3211: ICD-10-CM

## 2023-05-15 DIAGNOSIS — H35.3132: ICD-10-CM

## 2023-05-15 PROCEDURE — 92014 COMPRE OPH EXAM EST PT 1/>: CPT | Performed by: OPHTHALMOLOGY

## 2023-05-15 PROCEDURE — 67210 TREATMENT OF RETINAL LESION: CPT | Performed by: OPHTHALMOLOGY

## 2023-05-15 PROCEDURE — 92134 CPTRZ OPH DX IMG PST SGM RTA: CPT | Performed by: OPHTHALMOLOGY

## 2023-05-15 ASSESSMENT — KERATOMETRY
OD_K2POWER_DIOPTERS: 44.00
OS_K1POWER_DIOPTERS: 42.00
OS_K2POWER_DIOPTERS: 46.75
OS_AXISANGLE_DEGREES: 056
OD_K1POWER_DIOPTERS: 43.25
OD_AXISANGLE_DEGREES: 090

## 2023-05-15 ASSESSMENT — LID EXAM ASSESSMENTS
OS_BLEPHARITIS: LLL 1+
OD_BLEPHARITIS: RLL 1+

## 2023-05-15 ASSESSMENT — REFRACTION_AUTOREFRACTION
OS_AXIS: 148
OS_SPHERE: +1.25
OS_CYLINDER: -3.50
OD_CYLINDER: -0.25
OD_SPHERE: -0.25
OD_AXIS: 141

## 2023-05-15 ASSESSMENT — REFRACTION_CURRENTRX
OD_AXIS: 180
OD_OVR_VA: 20/
OS_CYLINDER: 0.00
OD_SPHERE: +2.00
OS_OVR_VA: 20/
OD_CYLINDER: 0.00
OS_AXIS: 180
OS_SPHERE: +2.00

## 2023-05-15 ASSESSMENT — SPHEQUIV_DERIVED
OD_SPHEQUIV: -0.375
OS_SPHEQUIV: -0.5

## 2023-05-15 ASSESSMENT — CONFRONTATIONAL VISUAL FIELD TEST (CVF)
OD_FINDINGS: FULL
OS_FINDINGS: FULL

## 2023-05-15 ASSESSMENT — AXIALLENGTH_DERIVED
OD_AL: 23.6925
OS_AL: 23.466

## 2023-05-15 ASSESSMENT — VISUAL ACUITY
OS_BCVA: 20/20
OD_BCVA: 20/30

## 2023-05-15 ASSESSMENT — TONOMETRY
OD_IOP_MMHG: 15
OS_IOP_MMHG: 14

## 2023-05-15 ASSESSMENT — CORNEAL SURGICAL SCARRING: OS_SCARRING: ANTERIOR STROMAL

## 2023-05-23 ENCOUNTER — OFFICE (OUTPATIENT)
Dept: URBAN - METROPOLITAN AREA CLINIC 63 | Facility: CLINIC | Age: 78
Setting detail: OPHTHALMOLOGY
End: 2023-05-23
Payer: MEDICARE

## 2023-05-23 DIAGNOSIS — H01.005: ICD-10-CM

## 2023-05-23 DIAGNOSIS — H01.002: ICD-10-CM

## 2023-05-23 DIAGNOSIS — H16.223: ICD-10-CM

## 2023-05-23 PROCEDURE — 68761 CLOSE TEAR DUCT OPENING: CPT | Performed by: STUDENT IN AN ORGANIZED HEALTH CARE EDUCATION/TRAINING PROGRAM

## 2023-05-23 PROCEDURE — 92012 INTRM OPH EXAM EST PATIENT: CPT | Performed by: STUDENT IN AN ORGANIZED HEALTH CARE EDUCATION/TRAINING PROGRAM

## 2023-05-23 ASSESSMENT — REFRACTION_AUTOREFRACTION
OS_AXIS: 140
OD_CYLINDER: -0.25
OS_CYLINDER: -3.50
OD_AXIS: 086
OS_SPHERE: +1.25
OD_SPHERE: +0.25

## 2023-05-23 ASSESSMENT — KERATOMETRY
OD_AXISANGLE_DEGREES: 077
OD_K1POWER_DIOPTERS: 42.50
OD_K2POWER_DIOPTERS: 43.00
OS_K1POWER_DIOPTERS: 42.25
OS_AXISANGLE_DEGREES: 052
OS_K2POWER_DIOPTERS: 47.00

## 2023-05-23 ASSESSMENT — TONOMETRY
OS_IOP_MMHG: 15
OD_IOP_MMHG: 14

## 2023-05-23 ASSESSMENT — TEAR BREAK UP TIME (TBUT)
OD_TBUT: 1+
OS_TBUT: 1+

## 2023-05-23 ASSESSMENT — AXIALLENGTH_DERIVED
OD_AL: 23.8209
OS_AL: 23.3755

## 2023-05-23 ASSESSMENT — LID EXAM ASSESSMENTS
OD_BLEPHARITIS: RLL 1+
OS_BLEPHARITIS: LLL 1+

## 2023-05-23 ASSESSMENT — REFRACTION_CURRENTRX
OS_OVR_VA: 20/
OS_SPHERE: +2.00
OD_CYLINDER: 0.00
OD_AXIS: 180
OD_SPHERE: +2.00
OD_OVR_VA: 20/
OS_AXIS: 180
OS_CYLINDER: 0.00

## 2023-05-23 ASSESSMENT — SPHEQUIV_DERIVED
OD_SPHEQUIV: 0.125
OS_SPHEQUIV: -0.5

## 2023-05-23 ASSESSMENT — SUPERFICIAL PUNCTATE KERATITIS (SPK)
OS_SPK: 2+ 3+
OD_SPK: 2+ 3+

## 2023-05-23 ASSESSMENT — CORNEAL SURGICAL SCARRING: OS_SCARRING: ANTERIOR STROMAL

## 2023-05-23 ASSESSMENT — VISUAL ACUITY
OS_BCVA: 20/20
OD_BCVA: 20/20-1

## 2023-05-23 ASSESSMENT — CONFRONTATIONAL VISUAL FIELD TEST (CVF)
OS_FINDINGS: FULL
OD_FINDINGS: FULL

## 2023-06-01 ENCOUNTER — OFFICE (OUTPATIENT)
Dept: URBAN - METROPOLITAN AREA CLINIC 63 | Facility: CLINIC | Age: 78
Setting detail: OPHTHALMOLOGY
End: 2023-06-01
Payer: MEDICARE

## 2023-06-01 DIAGNOSIS — H01.002: ICD-10-CM

## 2023-06-01 DIAGNOSIS — H01.005: ICD-10-CM

## 2023-06-01 DIAGNOSIS — H16.223: ICD-10-CM

## 2023-06-01 PROBLEM — E11.3211 DM TYPE 2; RIGHT MILD WITH ME, LEFT MILD WITH ME: Status: ACTIVE | Noted: 2023-05-15

## 2023-06-01 PROBLEM — E11.3212 DM TYPE 2; RIGHT MILD WITH ME, LEFT MILD WITH ME: Status: ACTIVE | Noted: 2023-05-15

## 2023-06-01 PROCEDURE — 92012 INTRM OPH EXAM EST PATIENT: CPT | Performed by: STUDENT IN AN ORGANIZED HEALTH CARE EDUCATION/TRAINING PROGRAM

## 2023-06-01 ASSESSMENT — KERATOMETRY
OS_K1POWER_DIOPTERS: 41.75
OS_AXISANGLE_DEGREES: 053
OS_K2POWER_DIOPTERS: 46.25
OD_K1POWER_DIOPTERS: 43.25
OD_AXISANGLE_DEGREES: 095
OD_K2POWER_DIOPTERS: 43.75

## 2023-06-01 ASSESSMENT — LID EXAM ASSESSMENTS
OD_BLEPHARITIS: RLL 1+
OS_BLEPHARITIS: LLL 1+

## 2023-06-01 ASSESSMENT — REFRACTION_CURRENTRX
OS_OVR_VA: 20/
OD_CYLINDER: 0.00
OS_AXIS: 180
OD_SPHERE: +2.00
OS_CYLINDER: 0.00
OD_AXIS: 180
OD_OVR_VA: 20/
OS_SPHERE: +2.00

## 2023-06-01 ASSESSMENT — REFRACTION_AUTOREFRACTION
OS_CYLINDER: -2.50
OD_CYLINDER: -0.25
OS_SPHERE: +1.50
OD_SPHERE: 0.00
OD_AXIS: 044
OS_AXIS: 142

## 2023-06-01 ASSESSMENT — SUPERFICIAL PUNCTATE KERATITIS (SPK)
OD_SPK: 2+ 3+
OS_SPK: 2+ 3+

## 2023-06-01 ASSESSMENT — TEAR BREAK UP TIME (TBUT)
OS_TBUT: 1+
OD_TBUT: 1+

## 2023-06-01 ASSESSMENT — TONOMETRY
OD_IOP_MMHG: 11
OS_IOP_MMHG: 13

## 2023-06-01 ASSESSMENT — VISUAL ACUITY
OS_BCVA: 20/30
OD_BCVA: 20/30

## 2023-06-01 ASSESSMENT — AXIALLENGTH_DERIVED
OD_AL: 23.6407
OS_AL: 23.3142

## 2023-06-01 ASSESSMENT — SPHEQUIV_DERIVED
OD_SPHEQUIV: -0.125
OS_SPHEQUIV: 0.25

## 2023-06-01 ASSESSMENT — CORNEAL SURGICAL SCARRING: OS_SCARRING: ANTERIOR STROMAL

## 2023-06-03 NOTE — DISCHARGE NOTE ADULT - NURSING SECTION COMPLETE
normal... Well appearing, awake, alert, oriented to person, place, time/situation and in no apparent distress. Patient/Caregiver provided printed discharge information.

## 2023-06-19 ENCOUNTER — OFFICE (OUTPATIENT)
Dept: URBAN - METROPOLITAN AREA CLINIC 63 | Facility: CLINIC | Age: 78
Setting detail: OPHTHALMOLOGY
End: 2023-06-19
Payer: MEDICARE

## 2023-06-19 DIAGNOSIS — E11.3211: ICD-10-CM

## 2023-06-19 DIAGNOSIS — E11.3213: ICD-10-CM

## 2023-06-19 DIAGNOSIS — E11.3212: ICD-10-CM

## 2023-06-19 PROCEDURE — 99024 POSTOP FOLLOW-UP VISIT: CPT | Performed by: OPHTHALMOLOGY

## 2023-06-19 PROCEDURE — 67210 TREATMENT OF RETINAL LESION: CPT | Performed by: OPHTHALMOLOGY

## 2023-06-19 ASSESSMENT — SUPERFICIAL PUNCTATE KERATITIS (SPK)
OD_SPK: 2+ 3+
OS_SPK: 2+ 3+

## 2023-06-19 ASSESSMENT — REFRACTION_AUTOREFRACTION
OD_AXIS: 044
OS_AXIS: 142
OS_SPHERE: +1.50
OD_CYLINDER: -0.25
OD_SPHERE: 0.00
OS_CYLINDER: -2.50

## 2023-06-19 ASSESSMENT — TONOMETRY
OS_IOP_MMHG: 12
OD_IOP_MMHG: 13

## 2023-06-19 ASSESSMENT — KERATOMETRY
OD_K1POWER_DIOPTERS: 43.25
OD_AXISANGLE_DEGREES: 095
OS_K1POWER_DIOPTERS: 41.75
OD_K2POWER_DIOPTERS: 43.75
OS_K2POWER_DIOPTERS: 46.25
OS_AXISANGLE_DEGREES: 053

## 2023-06-19 ASSESSMENT — AXIALLENGTH_DERIVED
OD_AL: 23.6407
OS_AL: 23.3142

## 2023-06-19 ASSESSMENT — SPHEQUIV_DERIVED
OD_SPHEQUIV: -0.125
OS_SPHEQUIV: 0.25

## 2023-06-19 ASSESSMENT — VISUAL ACUITY
OS_BCVA: 20/20-1
OD_BCVA: 20/20

## 2023-06-19 ASSESSMENT — TEAR BREAK UP TIME (TBUT)
OD_TBUT: 1+
OS_TBUT: 1+

## 2023-06-19 ASSESSMENT — LID EXAM ASSESSMENTS
OD_BLEPHARITIS: RLL 1+
OS_BLEPHARITIS: LLL 1+

## 2023-06-19 ASSESSMENT — CONFRONTATIONAL VISUAL FIELD TEST (CVF)
OD_FINDINGS: FULL
OS_FINDINGS: FULL

## 2023-06-19 ASSESSMENT — CORNEAL SURGICAL SCARRING: OS_SCARRING: ANTERIOR STROMAL

## 2023-06-23 ENCOUNTER — OFFICE (OUTPATIENT)
Dept: URBAN - METROPOLITAN AREA CLINIC 63 | Facility: CLINIC | Age: 78
Setting detail: OPHTHALMOLOGY
End: 2023-06-23
Payer: MEDICARE

## 2023-06-23 DIAGNOSIS — H16.221: ICD-10-CM

## 2023-06-23 DIAGNOSIS — H16.222: ICD-10-CM

## 2023-06-23 DIAGNOSIS — H16.223: ICD-10-CM

## 2023-06-23 DIAGNOSIS — H01.005: ICD-10-CM

## 2023-06-23 DIAGNOSIS — H01.002: ICD-10-CM

## 2023-06-23 PROBLEM — E11.3211 DM TYPE 2; RIGHT MILD WITH ME, LEFT MILD WITH ME: Status: ACTIVE | Noted: 2023-06-19

## 2023-06-23 PROBLEM — E11.3213 DM TYPE 2; RIGHT MILD WITH ME, LEFT MILD WITH ME: Status: ACTIVE | Noted: 2023-06-19

## 2023-06-23 PROBLEM — E11.3212 DM TYPE 2; RIGHT MILD WITH ME, LEFT MILD WITH ME: Status: ACTIVE | Noted: 2023-06-19

## 2023-06-23 PROCEDURE — 83861 MICROFLUID ANALY TEARS: CPT | Performed by: STUDENT IN AN ORGANIZED HEALTH CARE EDUCATION/TRAINING PROGRAM

## 2023-06-23 PROCEDURE — 92012 INTRM OPH EXAM EST PATIENT: CPT | Performed by: STUDENT IN AN ORGANIZED HEALTH CARE EDUCATION/TRAINING PROGRAM

## 2023-06-23 ASSESSMENT — CORNEAL SURGICAL SCARRING: OS_SCARRING: ANTERIOR STROMAL

## 2023-06-23 ASSESSMENT — REFRACTION_AUTOREFRACTION
OD_SPHERE: +0.25
OS_AXIS: 130
OS_SPHERE: +1.50
OS_CYLINDER: -3.75
OD_AXIS: 097
OD_CYLINDER: -0.25

## 2023-06-23 ASSESSMENT — VISUAL ACUITY
OD_BCVA: 20/25
OS_BCVA: 20/20

## 2023-06-23 ASSESSMENT — KERATOMETRY
OD_K1POWER_DIOPTERS: 42.00
OD_AXISANGLE_DEGREES: 007
OS_K1POWER_DIOPTERS: 42.00
OD_K2POWER_DIOPTERS: 42.75
OS_AXISANGLE_DEGREES: 052
OS_K2POWER_DIOPTERS: 46.50

## 2023-06-23 ASSESSMENT — TONOMETRY
OD_IOP_MMHG: 13
OS_IOP_MMHG: 13

## 2023-06-23 ASSESSMENT — SPHEQUIV_DERIVED
OS_SPHEQUIV: -0.375
OD_SPHEQUIV: 0.125

## 2023-06-23 ASSESSMENT — CONFRONTATIONAL VISUAL FIELD TEST (CVF)
OS_FINDINGS: FULL
OD_FINDINGS: FULL

## 2023-06-23 ASSESSMENT — SUPERFICIAL PUNCTATE KERATITIS (SPK)
OS_SPK: 1+
OD_SPK: 1+

## 2023-06-23 ASSESSMENT — LID EXAM ASSESSMENTS
OS_BLEPHARITIS: LLL 1+
OD_BLEPHARITIS: RLL 1+

## 2023-06-23 ASSESSMENT — AXIALLENGTH_DERIVED
OS_AL: 23.4632
OD_AL: 23.9621

## 2023-06-27 ENCOUNTER — APPOINTMENT (OUTPATIENT)
Dept: RADIOLOGY | Facility: HOSPITAL | Age: 78
End: 2023-06-27
Payer: MEDICARE

## 2023-06-27 ENCOUNTER — OUTPATIENT (OUTPATIENT)
Dept: OUTPATIENT SERVICES | Facility: HOSPITAL | Age: 78
LOS: 1 days | End: 2023-06-27
Payer: MEDICARE

## 2023-06-27 DIAGNOSIS — R13.14 DYSPHAGIA, PHARYNGOESOPHAGEAL PHASE: ICD-10-CM

## 2023-06-27 DIAGNOSIS — I99.8 OTHER DISORDER OF CIRCULATORY SYSTEM: Chronic | ICD-10-CM

## 2023-06-27 DIAGNOSIS — Z95.5 PRESENCE OF CORONARY ANGIOPLASTY IMPLANT AND GRAFT: Chronic | ICD-10-CM

## 2023-06-27 DIAGNOSIS — K66.0 PERITONEAL ADHESIONS (POSTPROCEDURAL) (POSTINFECTION): Chronic | ICD-10-CM

## 2023-06-27 DIAGNOSIS — Z90.49 ACQUIRED ABSENCE OF OTHER SPECIFIED PARTS OF DIGESTIVE TRACT: Chronic | ICD-10-CM

## 2023-06-27 DIAGNOSIS — Z98.890 OTHER SPECIFIED POSTPROCEDURAL STATES: Chronic | ICD-10-CM

## 2023-06-27 DIAGNOSIS — Z95.0 PRESENCE OF CARDIAC PACEMAKER: Chronic | ICD-10-CM

## 2023-06-27 PROCEDURE — 74221 X-RAY XM ESOPHAGUS 2CNTRST: CPT

## 2023-06-27 PROCEDURE — 74221 X-RAY XM ESOPHAGUS 2CNTRST: CPT | Mod: 26

## 2023-07-25 ENCOUNTER — OFFICE (OUTPATIENT)
Dept: URBAN - METROPOLITAN AREA CLINIC 63 | Facility: CLINIC | Age: 78
Setting detail: OPHTHALMOLOGY
End: 2023-07-25
Payer: MEDICARE

## 2023-07-25 DIAGNOSIS — H01.005: ICD-10-CM

## 2023-07-25 DIAGNOSIS — H01.002: ICD-10-CM

## 2023-07-25 DIAGNOSIS — H16.221: ICD-10-CM

## 2023-07-25 DIAGNOSIS — H16.222: ICD-10-CM

## 2023-07-25 DIAGNOSIS — H16.223: ICD-10-CM

## 2023-07-25 PROCEDURE — 92012 INTRM OPH EXAM EST PATIENT: CPT | Performed by: STUDENT IN AN ORGANIZED HEALTH CARE EDUCATION/TRAINING PROGRAM

## 2023-07-25 ASSESSMENT — CONFRONTATIONAL VISUAL FIELD TEST (CVF)
OD_FINDINGS: FULL
OS_FINDINGS: FULL

## 2023-07-25 ASSESSMENT — TONOMETRY
OS_IOP_MMHG: 14
OD_IOP_MMHG: 14

## 2023-07-26 ENCOUNTER — RX ONLY (RX ONLY)
Age: 78
End: 2023-07-26

## 2023-07-26 RX ORDER — CYCLOSPORINE 0.5 MG/ML
EMULSION OPHTHALMIC
Qty: 1 | Refills: 11 | Status: ACTIVE | OUTPATIENT

## 2023-07-26 ASSESSMENT — LID EXAM ASSESSMENTS
OD_BLEPHARITIS: RLL 1+
OS_BLEPHARITIS: LLL 1+

## 2023-07-26 ASSESSMENT — KERATOMETRY
OD_K2POWER_DIOPTERS: 43.25
OD_AXISANGLE_DEGREES: 091
OS_K2POWER_DIOPTERS: 46.25
OS_AXISANGLE_DEGREES: 057
OD_K1POWER_DIOPTERS: 42.75
OS_K1POWER_DIOPTERS: 42.00

## 2023-07-26 ASSESSMENT — CORNEAL SURGICAL SCARRING: OS_SCARRING: ANTERIOR STROMAL

## 2023-07-26 ASSESSMENT — REFRACTION_AUTOREFRACTION
OD_CYLINDER: -0.25
OS_SPHERE: +1.50
OD_SPHERE: +0.25
OS_AXIS: 142
OD_AXIS: 169
OS_CYLINDER: -2.50

## 2023-07-26 ASSESSMENT — AXIALLENGTH_DERIVED
OS_AL: 23.2694
OD_AL: 23.7276

## 2023-07-26 ASSESSMENT — SPHEQUIV_DERIVED
OD_SPHEQUIV: 0.125
OS_SPHEQUIV: 0.25

## 2023-07-26 ASSESSMENT — VISUAL ACUITY
OD_BCVA: 20/30
OS_BCVA: 20/30-1

## 2023-07-26 ASSESSMENT — SUPERFICIAL PUNCTATE KERATITIS (SPK)
OD_SPK: 1+
OS_SPK: 1+

## 2023-08-23 ENCOUNTER — OFFICE (OUTPATIENT)
Dept: URBAN - METROPOLITAN AREA CLINIC 63 | Facility: CLINIC | Age: 78
Setting detail: OPHTHALMOLOGY
End: 2023-08-23
Payer: MEDICARE

## 2023-08-23 DIAGNOSIS — H16.221: ICD-10-CM

## 2023-08-23 DIAGNOSIS — H01.002: ICD-10-CM

## 2023-08-23 DIAGNOSIS — H16.222: ICD-10-CM

## 2023-08-23 DIAGNOSIS — H16.223: ICD-10-CM

## 2023-08-23 DIAGNOSIS — H43.393: ICD-10-CM

## 2023-08-23 DIAGNOSIS — H01.005: ICD-10-CM

## 2023-08-23 PROCEDURE — 92012 INTRM OPH EXAM EST PATIENT: CPT | Performed by: STUDENT IN AN ORGANIZED HEALTH CARE EDUCATION/TRAINING PROGRAM

## 2023-08-23 PROCEDURE — 83861 MICROFLUID ANALY TEARS: CPT | Performed by: STUDENT IN AN ORGANIZED HEALTH CARE EDUCATION/TRAINING PROGRAM

## 2023-08-23 ASSESSMENT — VISUAL ACUITY
OD_BCVA: 20/25-1
OS_BCVA: 20/20

## 2023-08-23 ASSESSMENT — KERATOMETRY
OD_K1POWER_DIOPTERS: 42.75
OS_K2POWER_DIOPTERS: 46.00
OS_K1POWER_DIOPTERS: 41.25
OD_K2POWER_DIOPTERS: 43.25
OS_AXISANGLE_DEGREES: 053
OD_AXISANGLE_DEGREES: 096

## 2023-08-23 ASSESSMENT — REFRACTION_AUTOREFRACTION
OD_SPHERE: 0.00
OD_CYLINDER: 0.00
OD_AXIS: 000
OS_AXIS: 146
OS_CYLINDER: -2.50
OS_SPHERE: +2.00

## 2023-08-23 ASSESSMENT — TONOMETRY
OD_IOP_MMHG: 18
OS_IOP_MMHG: 18

## 2023-08-23 ASSESSMENT — LID EXAM ASSESSMENTS
OD_BLEPHARITIS: RLL 1+
OS_BLEPHARITIS: LLL 1+

## 2023-08-23 ASSESSMENT — SPHEQUIV_DERIVED
OD_SPHEQUIV: 0
OS_SPHEQUIV: 0.75

## 2023-08-23 ASSESSMENT — SUPERFICIAL PUNCTATE KERATITIS (SPK)
OD_SPK: 1+
OS_SPK: 1+

## 2023-08-23 ASSESSMENT — AXIALLENGTH_DERIVED
OD_AL: 23.777
OS_AL: 23.2586

## 2023-08-23 ASSESSMENT — CORNEAL SURGICAL SCARRING: OS_SCARRING: ANTERIOR STROMAL

## 2023-09-18 ENCOUNTER — OFFICE (OUTPATIENT)
Dept: URBAN - METROPOLITAN AREA CLINIC 63 | Facility: CLINIC | Age: 78
Setting detail: OPHTHALMOLOGY
End: 2023-09-18
Payer: MEDICARE

## 2023-09-18 DIAGNOSIS — E11.3213: ICD-10-CM

## 2023-09-18 DIAGNOSIS — H35.3132: ICD-10-CM

## 2023-09-18 DIAGNOSIS — H35.033: ICD-10-CM

## 2023-09-18 DIAGNOSIS — E11.3211: ICD-10-CM

## 2023-09-18 DIAGNOSIS — H43.393: ICD-10-CM

## 2023-09-18 PROCEDURE — 67210 TREATMENT OF RETINAL LESION: CPT | Performed by: OPHTHALMOLOGY

## 2023-09-18 PROCEDURE — 92134 CPTRZ OPH DX IMG PST SGM RTA: CPT | Performed by: OPHTHALMOLOGY

## 2023-09-18 ASSESSMENT — KERATOMETRY
OS_K2POWER_DIOPTERS: 46.00
OS_K1POWER_DIOPTERS: 41.25
OS_AXISANGLE_DEGREES: 053
OD_AXISANGLE_DEGREES: 096
OD_K1POWER_DIOPTERS: 42.75
OD_K2POWER_DIOPTERS: 43.25

## 2023-09-18 ASSESSMENT — LID EXAM ASSESSMENTS
OS_BLEPHARITIS: LLL 1+
OD_BLEPHARITIS: RLL 1+

## 2023-09-18 ASSESSMENT — SPHEQUIV_DERIVED
OS_SPHEQUIV: 0.75
OD_SPHEQUIV: 0

## 2023-09-18 ASSESSMENT — CORNEAL SURGICAL SCARRING: OS_SCARRING: ANTERIOR STROMAL

## 2023-09-18 ASSESSMENT — TONOMETRY
OS_IOP_MMHG: 17
OD_IOP_MMHG: 14

## 2023-09-18 ASSESSMENT — CONFRONTATIONAL VISUAL FIELD TEST (CVF)
OS_FINDINGS: FULL
OD_FINDINGS: FULL

## 2023-09-18 ASSESSMENT — REFRACTION_AUTOREFRACTION
OD_AXIS: 000
OD_SPHERE: 0.00
OS_SPHERE: +2.00
OS_AXIS: 146
OS_CYLINDER: -2.50
OD_CYLINDER: 0.00

## 2023-09-18 ASSESSMENT — AXIALLENGTH_DERIVED
OS_AL: 23.2586
OD_AL: 23.777

## 2023-09-18 ASSESSMENT — VISUAL ACUITY
OS_BCVA: 20/25+1
OD_BCVA: 20/25

## 2023-09-18 ASSESSMENT — SUPERFICIAL PUNCTATE KERATITIS (SPK)
OD_SPK: 1+
OS_SPK: 1+

## 2023-10-06 ENCOUNTER — ASC (OUTPATIENT)
Dept: URBAN - METROPOLITAN AREA SURGERY 8 | Facility: SURGERY | Age: 78
Setting detail: OPHTHALMOLOGY
End: 2023-10-06
Payer: MEDICARE

## 2023-10-06 DIAGNOSIS — H43.392: ICD-10-CM

## 2023-10-06 PROCEDURE — 67036 REMOVAL OF INNER EYE FLUID: CPT | Mod: 79,LT | Performed by: OPHTHALMOLOGY

## 2023-10-07 ENCOUNTER — OFFICE (OUTPATIENT)
Dept: URBAN - METROPOLITAN AREA CLINIC 94 | Facility: CLINIC | Age: 78
Setting detail: OPHTHALMOLOGY
End: 2023-10-07
Payer: MEDICARE

## 2023-10-07 DIAGNOSIS — H43.391: ICD-10-CM

## 2023-10-07 PROCEDURE — 99024 POSTOP FOLLOW-UP VISIT: CPT | Performed by: SPECIALIST

## 2023-10-07 ASSESSMENT — SPHEQUIV_DERIVED
OD_SPHEQUIV: 0
OS_SPHEQUIV: 0.75

## 2023-10-07 ASSESSMENT — CONFRONTATIONAL VISUAL FIELD TEST (CVF)
OD_FINDINGS: FULL
OS_FINDINGS: FULL

## 2023-10-07 ASSESSMENT — KERATOMETRY
OS_AXISANGLE_DEGREES: 053
OD_K1POWER_DIOPTERS: 42.75
OD_AXISANGLE_DEGREES: 096
OS_K2POWER_DIOPTERS: 46.00
OS_K1POWER_DIOPTERS: 41.25
OD_K2POWER_DIOPTERS: 43.25

## 2023-10-07 ASSESSMENT — AXIALLENGTH_DERIVED
OS_AL: 23.2586
OD_AL: 23.777

## 2023-10-07 ASSESSMENT — VISUAL ACUITY
OS_BCVA: 20/20
OD_BCVA: 20/25

## 2023-10-07 ASSESSMENT — CORNEAL SURGICAL SCARRING: OS_SCARRING: ANTERIOR STROMAL

## 2023-10-07 ASSESSMENT — REFRACTION_AUTOREFRACTION
OS_AXIS: 146
OD_SPHERE: 0.00
OS_SPHERE: +2.00
OD_AXIS: 000
OS_CYLINDER: -2.50
OD_CYLINDER: 0.00

## 2023-10-07 ASSESSMENT — SUPERFICIAL PUNCTATE KERATITIS (SPK)
OD_SPK: 1+
OS_SPK: 1+

## 2023-10-07 ASSESSMENT — LID EXAM ASSESSMENTS
OS_BLEPHARITIS: LLL 1+
OD_BLEPHARITIS: RLL 1+

## 2023-10-26 ENCOUNTER — OFFICE (OUTPATIENT)
Dept: URBAN - METROPOLITAN AREA CLINIC 63 | Facility: CLINIC | Age: 78
Setting detail: OPHTHALMOLOGY
End: 2023-10-26
Payer: MEDICARE

## 2023-10-26 ENCOUNTER — RX ONLY (RX ONLY)
Age: 78
End: 2023-10-26

## 2023-10-26 DIAGNOSIS — E11.3211: ICD-10-CM

## 2023-10-26 DIAGNOSIS — E11.3212: ICD-10-CM

## 2023-10-26 PROCEDURE — 67210 TREATMENT OF RETINAL LESION: CPT | Mod: 79,LT | Performed by: OPHTHALMOLOGY

## 2023-10-26 PROCEDURE — 92134 CPTRZ OPH DX IMG PST SGM RTA: CPT | Performed by: OPHTHALMOLOGY

## 2023-10-26 ASSESSMENT — KERATOMETRY
OS_K1POWER_DIOPTERS: 41.25
OD_K2POWER_DIOPTERS: 43.25
OD_AXISANGLE_DEGREES: 096
OD_K1POWER_DIOPTERS: 42.75
OS_AXISANGLE_DEGREES: 053
OS_K2POWER_DIOPTERS: 46.00

## 2023-10-26 ASSESSMENT — REFRACTION_AUTOREFRACTION
OS_SPHERE: +2.00
OD_AXIS: 000
OS_CYLINDER: -2.50
OD_SPHERE: 0.00
OD_CYLINDER: 0.00
OS_AXIS: 146

## 2023-10-26 ASSESSMENT — TONOMETRY
OD_IOP_MMHG: 14
OS_IOP_MMHG: 16

## 2023-10-26 ASSESSMENT — AXIALLENGTH_DERIVED
OS_AL: 23.2586
OD_AL: 23.777

## 2023-10-26 ASSESSMENT — SUPERFICIAL PUNCTATE KERATITIS (SPK)
OS_SPK: 1+
OD_SPK: 1+

## 2023-10-26 ASSESSMENT — CORNEAL SURGICAL SCARRING: OS_SCARRING: ANTERIOR STROMAL

## 2023-10-26 ASSESSMENT — SPHEQUIV_DERIVED
OD_SPHEQUIV: 0
OS_SPHEQUIV: 0.75

## 2023-10-26 ASSESSMENT — CONFRONTATIONAL VISUAL FIELD TEST (CVF)
OD_FINDINGS: FULL
OS_FINDINGS: FULL

## 2023-10-26 ASSESSMENT — VISUAL ACUITY
OS_BCVA: 20/20-1
OD_BCVA: 20/25

## 2023-10-26 ASSESSMENT — LID EXAM ASSESSMENTS
OD_BLEPHARITIS: RLL 1+
OS_BLEPHARITIS: LLL 1+

## 2023-10-31 ENCOUNTER — OFFICE (OUTPATIENT)
Dept: URBAN - METROPOLITAN AREA CLINIC 63 | Facility: CLINIC | Age: 78
Setting detail: OPHTHALMOLOGY
End: 2023-10-31
Payer: MEDICARE

## 2023-10-31 DIAGNOSIS — H01.005: ICD-10-CM

## 2023-10-31 DIAGNOSIS — H01.002: ICD-10-CM

## 2023-10-31 DIAGNOSIS — H16.222: ICD-10-CM

## 2023-10-31 DIAGNOSIS — H35.033: ICD-10-CM

## 2023-10-31 DIAGNOSIS — H16.221: ICD-10-CM

## 2023-10-31 DIAGNOSIS — H16.223: ICD-10-CM

## 2023-10-31 DIAGNOSIS — Z96.1: ICD-10-CM

## 2023-10-31 PROBLEM — H43.391 VITREOUS FLOATERS;  , RIGHT EYE: Status: ACTIVE | Noted: 2023-10-07

## 2023-10-31 PROCEDURE — 92012 INTRM OPH EXAM EST PATIENT: CPT | Mod: 24 | Performed by: STUDENT IN AN ORGANIZED HEALTH CARE EDUCATION/TRAINING PROGRAM

## 2023-10-31 ASSESSMENT — CORNEAL SURGICAL SCARRING: OS_SCARRING: ANTERIOR STROMAL

## 2023-10-31 ASSESSMENT — VISUAL ACUITY
OS_BCVA: 20/20
OD_BCVA: 20/20

## 2023-10-31 ASSESSMENT — REFRACTION_AUTOREFRACTION
OD_SPHERE: +0.25
OS_CYLINDER: -5.75
OS_AXIS: 122
OS_SPHERE: +1.25
OD_CYLINDER: 0.00
OD_AXIS: 000

## 2023-10-31 ASSESSMENT — LID EXAM ASSESSMENTS
OS_BLEPHARITIS: LLL 1+
OD_BLEPHARITIS: RLL 1+

## 2023-10-31 ASSESSMENT — SUPERFICIAL PUNCTATE KERATITIS (SPK)
OD_SPK: 1+
OS_SPK: 1+

## 2023-10-31 ASSESSMENT — TONOMETRY
OD_IOP_MMHG: 13
OS_IOP_MMHG: 13

## 2023-10-31 ASSESSMENT — SPHEQUIV_DERIVED
OD_SPHEQUIV: 0.25
OS_SPHEQUIV: -1.625

## 2023-11-29 ENCOUNTER — OFFICE (OUTPATIENT)
Dept: URBAN - METROPOLITAN AREA CLINIC 63 | Facility: CLINIC | Age: 78
Setting detail: OPHTHALMOLOGY
End: 2023-11-29
Payer: MEDICARE

## 2023-11-29 ENCOUNTER — RX ONLY (RX ONLY)
Age: 78
End: 2023-11-29

## 2023-11-29 DIAGNOSIS — H16.221: ICD-10-CM

## 2023-11-29 DIAGNOSIS — H16.223: ICD-10-CM

## 2023-11-29 DIAGNOSIS — H01.002: ICD-10-CM

## 2023-11-29 DIAGNOSIS — H16.222: ICD-10-CM

## 2023-11-29 DIAGNOSIS — H01.005: ICD-10-CM

## 2023-11-29 DIAGNOSIS — H35.033: ICD-10-CM

## 2023-11-29 DIAGNOSIS — Z96.1: ICD-10-CM

## 2023-11-29 PROCEDURE — 92012 INTRM OPH EXAM EST PATIENT: CPT | Mod: 24 | Performed by: STUDENT IN AN ORGANIZED HEALTH CARE EDUCATION/TRAINING PROGRAM

## 2023-11-29 ASSESSMENT — CONFRONTATIONAL VISUAL FIELD TEST (CVF)
OD_FINDINGS: FULL
OS_FINDINGS: FULL

## 2023-11-29 ASSESSMENT — REFRACTION_AUTOREFRACTION
OS_SPHERE: +1.50
OS_CYLINDER: -2.75
OD_SPHERE: +0.25
OD_CYLINDER: -1.00
OS_AXIS: 155
OD_AXIS: 107

## 2023-11-29 ASSESSMENT — SPHEQUIV_DERIVED
OS_SPHEQUIV: 0.125
OD_SPHEQUIV: -0.25

## 2023-11-29 ASSESSMENT — LID EXAM ASSESSMENTS
OD_BLEPHARITIS: RLL 1+
OS_BLEPHARITIS: LLL 1+

## 2023-11-29 ASSESSMENT — SUPERFICIAL PUNCTATE KERATITIS (SPK)
OS_SPK: 1+
OD_SPK: 1+

## 2023-11-29 ASSESSMENT — CORNEAL SURGICAL SCARRING: OS_SCARRING: ANTERIOR STROMAL

## 2024-01-15 ENCOUNTER — OFFICE (OUTPATIENT)
Dept: URBAN - METROPOLITAN AREA CLINIC 63 | Facility: CLINIC | Age: 79
Setting detail: OPHTHALMOLOGY
End: 2024-01-15
Payer: MEDICARE

## 2024-01-15 DIAGNOSIS — E11.3212: ICD-10-CM

## 2024-01-15 DIAGNOSIS — E11.3211: ICD-10-CM

## 2024-01-15 DIAGNOSIS — H35.033: ICD-10-CM

## 2024-01-15 PROCEDURE — 92134 CPTRZ OPH DX IMG PST SGM RTA: CPT | Performed by: OPHTHALMOLOGY

## 2024-01-15 PROCEDURE — 67210 TREATMENT OF RETINAL LESION: CPT | Mod: 79,RT | Performed by: OPHTHALMOLOGY

## 2024-01-15 ASSESSMENT — LID EXAM ASSESSMENTS
OS_BLEPHARITIS: LLL 1+
OD_BLEPHARITIS: RLL 1+

## 2024-01-15 ASSESSMENT — REFRACTION_AUTOREFRACTION
OS_CYLINDER: -2.75
OD_CYLINDER: -1.00
OS_SPHERE: +1.50
OD_AXIS: 107
OD_SPHERE: +0.25
OS_AXIS: 155

## 2024-01-15 ASSESSMENT — SPHEQUIV_DERIVED
OD_SPHEQUIV: -0.25
OS_SPHEQUIV: 0.125

## 2024-01-15 ASSESSMENT — SUPERFICIAL PUNCTATE KERATITIS (SPK)
OS_SPK: 1+
OD_SPK: 1+

## 2024-01-15 ASSESSMENT — CORNEAL SURGICAL SCARRING: OS_SCARRING: ANTERIOR STROMAL

## 2024-01-15 ASSESSMENT — CONFRONTATIONAL VISUAL FIELD TEST (CVF)
OS_FINDINGS: FULL
OD_FINDINGS: FULL

## 2024-01-25 ENCOUNTER — OFFICE (OUTPATIENT)
Dept: URBAN - METROPOLITAN AREA CLINIC 63 | Facility: CLINIC | Age: 79
Setting detail: OPHTHALMOLOGY
End: 2024-01-25
Payer: MEDICARE

## 2024-01-25 DIAGNOSIS — E11.3212: ICD-10-CM

## 2024-01-25 DIAGNOSIS — H35.033: ICD-10-CM

## 2024-01-25 PROCEDURE — 92134 CPTRZ OPH DX IMG PST SGM RTA: CPT | Performed by: OPHTHALMOLOGY

## 2024-01-25 PROCEDURE — 67210 TREATMENT OF RETINAL LESION: CPT | Mod: 79,LT | Performed by: OPHTHALMOLOGY

## 2024-01-25 ASSESSMENT — SPHEQUIV_DERIVED
OD_SPHEQUIV: -0.25
OS_SPHEQUIV: 0.125

## 2024-01-25 ASSESSMENT — CORNEAL SURGICAL SCARRING: OS_SCARRING: ANTERIOR STROMAL

## 2024-01-25 ASSESSMENT — CONFRONTATIONAL VISUAL FIELD TEST (CVF)
OS_FINDINGS: FULL
OD_FINDINGS: FULL

## 2024-01-25 ASSESSMENT — SUPERFICIAL PUNCTATE KERATITIS (SPK)
OD_SPK: 1+
OS_SPK: 1+

## 2024-01-25 ASSESSMENT — REFRACTION_AUTOREFRACTION
OD_CYLINDER: -1.00
OS_AXIS: 155
OD_SPHERE: +0.25
OD_AXIS: 107
OS_SPHERE: +1.50
OS_CYLINDER: -2.75

## 2024-01-25 ASSESSMENT — LID EXAM ASSESSMENTS
OS_BLEPHARITIS: LLL 1+
OD_BLEPHARITIS: RLL 1+

## 2024-03-27 ENCOUNTER — OFFICE (OUTPATIENT)
Dept: URBAN - METROPOLITAN AREA CLINIC 63 | Facility: CLINIC | Age: 79
Setting detail: OPHTHALMOLOGY
End: 2024-03-27
Payer: MEDICARE

## 2024-03-27 DIAGNOSIS — H01.005: ICD-10-CM

## 2024-03-27 DIAGNOSIS — H16.221: ICD-10-CM

## 2024-03-27 DIAGNOSIS — H16.222: ICD-10-CM

## 2024-03-27 DIAGNOSIS — H16.223: ICD-10-CM

## 2024-03-27 DIAGNOSIS — Z96.1: ICD-10-CM

## 2024-03-27 DIAGNOSIS — H01.002: ICD-10-CM

## 2024-03-27 PROCEDURE — 92250 FUNDUS PHOTOGRAPHY W/I&R: CPT | Performed by: STUDENT IN AN ORGANIZED HEALTH CARE EDUCATION/TRAINING PROGRAM

## 2024-03-27 PROCEDURE — 92014 COMPRE OPH EXAM EST PT 1/>: CPT | Performed by: STUDENT IN AN ORGANIZED HEALTH CARE EDUCATION/TRAINING PROGRAM

## 2024-03-27 ASSESSMENT — LID EXAM ASSESSMENTS
OS_BLEPHARITIS: LLL 1+
OD_BLEPHARITIS: RLL 1+

## 2024-05-20 ENCOUNTER — OFFICE (OUTPATIENT)
Dept: URBAN - METROPOLITAN AREA CLINIC 63 | Facility: CLINIC | Age: 79
Setting detail: OPHTHALMOLOGY
End: 2024-05-20
Payer: MEDICARE

## 2024-05-20 DIAGNOSIS — H35.033: ICD-10-CM

## 2024-05-20 DIAGNOSIS — H43.391: ICD-10-CM

## 2024-05-20 DIAGNOSIS — H35.3132: ICD-10-CM

## 2024-05-20 DIAGNOSIS — E11.3211: ICD-10-CM

## 2024-05-20 DIAGNOSIS — E11.3212: ICD-10-CM

## 2024-05-20 PROCEDURE — 92134 CPTRZ OPH DX IMG PST SGM RTA: CPT | Performed by: OPHTHALMOLOGY

## 2024-05-20 PROCEDURE — 92014 COMPRE OPH EXAM EST PT 1/>: CPT | Performed by: OPHTHALMOLOGY

## 2024-05-20 ASSESSMENT — LID EXAM ASSESSMENTS
OS_BLEPHARITIS: LLL 1+
OD_BLEPHARITIS: RLL 1+

## 2024-05-20 ASSESSMENT — CONFRONTATIONAL VISUAL FIELD TEST (CVF)
OS_FINDINGS: FULL
OD_FINDINGS: FULL

## 2024-09-30 ENCOUNTER — OFFICE (OUTPATIENT)
Dept: URBAN - METROPOLITAN AREA CLINIC 63 | Facility: CLINIC | Age: 79
Setting detail: OPHTHALMOLOGY
End: 2024-09-30
Payer: MEDICARE

## 2024-09-30 DIAGNOSIS — H16.223: ICD-10-CM

## 2024-09-30 DIAGNOSIS — H16.222: ICD-10-CM

## 2024-09-30 DIAGNOSIS — H16.221: ICD-10-CM

## 2024-09-30 PROCEDURE — 92012 INTRM OPH EXAM EST PATIENT: CPT | Performed by: STUDENT IN AN ORGANIZED HEALTH CARE EDUCATION/TRAINING PROGRAM

## 2024-09-30 ASSESSMENT — CONFRONTATIONAL VISUAL FIELD TEST (CVF)
OS_FINDINGS: FULL
OD_FINDINGS: FULL

## 2024-10-01 ASSESSMENT — KERATOMETRY
OS_AXISANGLE_DEGREES: 050
OD_AXISANGLE_DEGREES: 072
OD_K1POWER_DIOPTERS: 43.25
OS_K2POWER_DIOPTERS: 46.75
OS_K1POWER_DIOPTERS: 42.25
OD_K2POWER_DIOPTERS: 44.00

## 2025-01-05 ENCOUNTER — OFFICE (OUTPATIENT)
Dept: URBAN - METROPOLITAN AREA CLINIC 63 | Facility: CLINIC | Age: 80
Setting detail: OPHTHALMOLOGY
End: 2025-01-05
Payer: MEDICARE

## 2025-01-05 DIAGNOSIS — H43.391: ICD-10-CM

## 2025-01-05 PROCEDURE — 92012 INTRM OPH EXAM EST PATIENT: CPT | Performed by: STUDENT IN AN ORGANIZED HEALTH CARE EDUCATION/TRAINING PROGRAM

## 2025-01-05 ASSESSMENT — REFRACTION_AUTOREFRACTION
OS_SPHERE: +1.00
OS_CYLINDER: -2.75
OD_CYLINDER: -0.25
OD_SPHERE: -0.25
OS_AXIS: 120
OD_AXIS: 105

## 2025-01-05 ASSESSMENT — LID EXAM ASSESSMENTS
OS_BLEPHARITIS: LLL 1+
OD_BLEPHARITIS: RLL 1+

## 2025-01-05 ASSESSMENT — TONOMETRY
OD_IOP_MMHG: 17
OS_IOP_MMHG: 16

## 2025-01-05 ASSESSMENT — SUPERFICIAL PUNCTATE KERATITIS (SPK)
OD_SPK: 1+
OS_SPK: 1+

## 2025-01-05 ASSESSMENT — CORNEAL SURGICAL SCARRING: OS_SCARRING: ANTERIOR STROMAL

## 2025-01-05 ASSESSMENT — KERATOMETRY
OD_K2POWER_DIOPTERS: 44.00
OS_K1POWER_DIOPTERS: 42.25
OD_AXISANGLE_DEGREES: 072
OS_AXISANGLE_DEGREES: 050
OS_K2POWER_DIOPTERS: 46.75
OD_K1POWER_DIOPTERS: 43.25

## 2025-01-05 ASSESSMENT — VISUAL ACUITY
OS_BCVA: 20/20
OD_BCVA: 20/30

## 2025-01-05 ASSESSMENT — CONFRONTATIONAL VISUAL FIELD TEST (CVF)
OS_FINDINGS: FULL
OD_FINDINGS: FULL

## 2025-01-20 ENCOUNTER — RX ONLY (RX ONLY)
Age: 80
End: 2025-01-20

## 2025-01-20 ENCOUNTER — OFFICE (OUTPATIENT)
Dept: URBAN - METROPOLITAN AREA CLINIC 63 | Facility: CLINIC | Age: 80
Setting detail: OPHTHALMOLOGY
End: 2025-01-20
Payer: MEDICARE

## 2025-01-20 DIAGNOSIS — H43.391: ICD-10-CM

## 2025-01-20 DIAGNOSIS — E11.3211: ICD-10-CM

## 2025-01-20 DIAGNOSIS — H35.3132: ICD-10-CM

## 2025-01-20 DIAGNOSIS — H35.033: ICD-10-CM

## 2025-01-20 DIAGNOSIS — E11.3212: ICD-10-CM

## 2025-01-20 PROCEDURE — 92134 CPTRZ OPH DX IMG PST SGM RTA: CPT | Performed by: OPHTHALMOLOGY

## 2025-01-20 PROCEDURE — 99215 OFFICE O/P EST HI 40 MIN: CPT | Performed by: OPHTHALMOLOGY

## 2025-01-20 ASSESSMENT — REFRACTION_AUTOREFRACTION
OD_CYLINDER: -0.25
OS_AXIS: 120
OD_AXIS: 105
OS_CYLINDER: -2.75
OD_SPHERE: -0.25
OS_SPHERE: +1.00

## 2025-01-20 ASSESSMENT — LID EXAM ASSESSMENTS
OS_BLEPHARITIS: LLL 1+
OD_BLEPHARITIS: RLL 1+

## 2025-01-20 ASSESSMENT — KERATOMETRY
OD_AXISANGLE_DEGREES: 072
OS_AXISANGLE_DEGREES: 050
OD_K1POWER_DIOPTERS: 43.25
OD_K2POWER_DIOPTERS: 44.00
OS_K1POWER_DIOPTERS: 42.25
OS_K2POWER_DIOPTERS: 46.75

## 2025-01-20 ASSESSMENT — SUPERFICIAL PUNCTATE KERATITIS (SPK)
OD_SPK: 1+
OS_SPK: 1+

## 2025-01-20 ASSESSMENT — VISUAL ACUITY
OS_BCVA: 20/20-1
OD_BCVA: 20/30+1

## 2025-01-20 ASSESSMENT — TONOMETRY
OD_IOP_MMHG: 14
OS_IOP_MMHG: 14

## 2025-01-20 ASSESSMENT — CORNEAL SURGICAL SCARRING: OS_SCARRING: ANTERIOR STROMAL

## 2025-01-27 NOTE — PATIENT PROFILE ADULT. - VISION (WITH CORRECTIVE LENSES IF THE PATIENT USUALLY WEARS THEM):
Pt requesting an update from Damon OCHOA MD aware.  
Normal vision: sees adequately in most situations; can see medication labels, newsprint

## 2025-03-14 ENCOUNTER — ASC (OUTPATIENT)
Dept: URBAN - METROPOLITAN AREA SURGERY 8 | Facility: SURGERY | Age: 80
Setting detail: OPHTHALMOLOGY
End: 2025-03-14
Payer: MEDICARE

## 2025-03-14 DIAGNOSIS — H43.391: ICD-10-CM

## 2025-03-14 PROCEDURE — 67036 REMOVAL OF INNER EYE FLUID: CPT | Mod: RT | Performed by: OPHTHALMOLOGY

## 2025-03-15 ENCOUNTER — RX ONLY (RX ONLY)
Age: 80
End: 2025-03-15

## 2025-03-15 ENCOUNTER — OFFICE (OUTPATIENT)
Dept: URBAN - METROPOLITAN AREA CLINIC 63 | Facility: CLINIC | Age: 80
Setting detail: OPHTHALMOLOGY
End: 2025-03-15
Payer: MEDICARE

## 2025-03-15 DIAGNOSIS — H43.393: ICD-10-CM

## 2025-03-15 PROCEDURE — 99024 POSTOP FOLLOW-UP VISIT: CPT | Performed by: STUDENT IN AN ORGANIZED HEALTH CARE EDUCATION/TRAINING PROGRAM

## 2025-03-15 ASSESSMENT — VISUAL ACUITY
OS_BCVA: 20/100
OD_BCVA: 20/30+1

## 2025-03-15 ASSESSMENT — CONFRONTATIONAL VISUAL FIELD TEST (CVF)
OD_FINDINGS: FULL
OS_FINDINGS: FULL

## 2025-03-15 ASSESSMENT — KERATOMETRY
OD_AXISANGLE_DEGREES: 142
OD_K1POWER_DIOPTERS: 42.50
OS_K2POWER_DIOPTERS: 46.75
OS_AXISANGLE_DEGREES: 056
OS_K1POWER_DIOPTERS: 42.00
OD_K2POWER_DIOPTERS: 43.25

## 2025-03-15 ASSESSMENT — TONOMETRY
OD_IOP_MMHG: 16
OS_IOP_MMHG: 18

## 2025-03-15 ASSESSMENT — REFRACTION_AUTOREFRACTION
OD_CYLINDER: -1.50
OD_SPHERE: +1.50
OD_AXIS: 087

## 2025-03-15 ASSESSMENT — SUPERFICIAL PUNCTATE KERATITIS (SPK)
OS_SPK: 1+
OD_SPK: 1+

## 2025-03-15 ASSESSMENT — LID EXAM ASSESSMENTS
OS_BLEPHARITIS: LLL 1+
OD_BLEPHARITIS: RLL 1+

## 2025-03-15 ASSESSMENT — CORNEAL SURGICAL SCARRING: OS_SCARRING: ANTERIOR STROMAL

## 2025-03-17 ENCOUNTER — OFFICE (OUTPATIENT)
Dept: URBAN - METROPOLITAN AREA CLINIC 63 | Facility: CLINIC | Age: 80
Setting detail: OPHTHALMOLOGY
End: 2025-03-17
Payer: MEDICARE

## 2025-03-17 DIAGNOSIS — H43.393: ICD-10-CM

## 2025-03-17 PROCEDURE — 99024 POSTOP FOLLOW-UP VISIT: CPT | Performed by: OPHTHALMOLOGY

## 2025-03-17 ASSESSMENT — KERATOMETRY
OD_K1POWER_DIOPTERS: 42.50
OS_K2POWER_DIOPTERS: 46.75
OD_K2POWER_DIOPTERS: 43.25
OS_AXISANGLE_DEGREES: 056
OS_K1POWER_DIOPTERS: 42.00
OD_AXISANGLE_DEGREES: 142

## 2025-03-17 ASSESSMENT — TONOMETRY
OD_IOP_MMHG: 17
OS_IOP_MMHG: 15

## 2025-03-17 ASSESSMENT — REFRACTION_AUTOREFRACTION
OD_CYLINDER: -1.50
OD_AXIS: 087
OD_SPHERE: +1.50

## 2025-03-17 ASSESSMENT — VISUAL ACUITY
OS_BCVA: 20/25
OD_BCVA: 20/25

## 2025-03-17 ASSESSMENT — LID EXAM ASSESSMENTS
OD_BLEPHARITIS: RLL 1+
OS_BLEPHARITIS: LLL 1+

## 2025-03-17 ASSESSMENT — CONFRONTATIONAL VISUAL FIELD TEST (CVF)
OS_FINDINGS: FULL
OD_FINDINGS: FULL

## 2025-03-17 ASSESSMENT — SUPERFICIAL PUNCTATE KERATITIS (SPK)
OS_SPK: 1+
OD_SPK: 1+

## 2025-03-17 ASSESSMENT — CORNEAL SURGICAL SCARRING: OS_SCARRING: ANTERIOR STROMAL

## 2025-04-24 ENCOUNTER — OFFICE (OUTPATIENT)
Dept: URBAN - METROPOLITAN AREA CLINIC 63 | Facility: CLINIC | Age: 80
Setting detail: OPHTHALMOLOGY
End: 2025-04-24
Payer: MEDICARE

## 2025-04-24 DIAGNOSIS — H43.393: ICD-10-CM

## 2025-04-24 PROCEDURE — 99024 POSTOP FOLLOW-UP VISIT: CPT | Performed by: OPHTHALMOLOGY

## 2025-04-24 ASSESSMENT — TONOMETRY
OS_IOP_MMHG: 13
OD_IOP_MMHG: 15

## 2025-04-24 ASSESSMENT — KERATOMETRY
OD_AXISANGLE_DEGREES: 142
OS_K2POWER_DIOPTERS: 46.75
OD_K2POWER_DIOPTERS: 43.25
OS_AXISANGLE_DEGREES: 056
OS_K1POWER_DIOPTERS: 42.00
OD_K1POWER_DIOPTERS: 42.50

## 2025-04-24 ASSESSMENT — VISUAL ACUITY
OS_BCVA: 20/20-1
OD_BCVA: 20/30+1

## 2025-04-24 ASSESSMENT — CORNEAL SURGICAL SCARRING: OS_SCARRING: ANTERIOR STROMAL

## 2025-04-24 ASSESSMENT — REFRACTION_AUTOREFRACTION
OD_AXIS: 087
OD_SPHERE: +1.50
OD_CYLINDER: -1.50

## 2025-04-24 ASSESSMENT — SUPERFICIAL PUNCTATE KERATITIS (SPK)
OS_SPK: 1+
OD_SPK: 1+

## 2025-04-24 ASSESSMENT — LID EXAM ASSESSMENTS
OD_BLEPHARITIS: RLL 1+
OS_BLEPHARITIS: LLL 1+

## 2025-08-18 ENCOUNTER — OFFICE (OUTPATIENT)
Dept: URBAN - METROPOLITAN AREA CLINIC 110 | Facility: CLINIC | Age: 80
Setting detail: OPHTHALMOLOGY
End: 2025-08-18
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
## 2025-08-18 PROCEDURE — NO SHOW FE NO SHOW FEE: Performed by: OPHTHALMOLOGY
